# Patient Record
Sex: MALE | Race: WHITE | Employment: OTHER | URBAN - METROPOLITAN AREA
[De-identification: names, ages, dates, MRNs, and addresses within clinical notes are randomized per-mention and may not be internally consistent; named-entity substitution may affect disease eponyms.]

---

## 2017-04-04 ENCOUNTER — OFFICE VISIT (OUTPATIENT)
Dept: CARDIOLOGY CLINIC | Age: 67
End: 2017-04-04

## 2017-04-04 VITALS
BODY MASS INDEX: 30.63 KG/M2 | HEIGHT: 66 IN | SYSTOLIC BLOOD PRESSURE: 132 MMHG | WEIGHT: 190.6 LBS | OXYGEN SATURATION: 98 % | HEART RATE: 59 BPM | DIASTOLIC BLOOD PRESSURE: 84 MMHG

## 2017-04-04 DIAGNOSIS — I65.23 OBSTRUCTION OF CAROTID ARTERY, BILATERAL: ICD-10-CM

## 2017-04-04 DIAGNOSIS — I25.10 ATHEROSCLEROSIS OF NATIVE CORONARY ARTERY OF NATIVE HEART WITHOUT ANGINA PECTORIS: Primary | Chronic | ICD-10-CM

## 2017-04-04 DIAGNOSIS — N52.9 ERECTILE DYSFUNCTION, UNSPECIFIED ERECTILE DYSFUNCTION TYPE: ICD-10-CM

## 2017-04-04 DIAGNOSIS — E78.5 HYPERLIPIDEMIA, UNSPECIFIED HYPERLIPIDEMIA TYPE: Chronic | ICD-10-CM

## 2017-04-04 DIAGNOSIS — I10 ESSENTIAL HYPERTENSION, BENIGN: Chronic | ICD-10-CM

## 2017-04-04 PROCEDURE — 1123F ACP DISCUSS/DSCN MKR DOCD: CPT | Performed by: INTERNAL MEDICINE

## 2017-04-04 PROCEDURE — 1036F TOBACCO NON-USER: CPT | Performed by: INTERNAL MEDICINE

## 2017-04-04 PROCEDURE — 99214 OFFICE O/P EST MOD 30 MIN: CPT | Performed by: INTERNAL MEDICINE

## 2017-04-04 PROCEDURE — G8417 CALC BMI ABV UP PARAM F/U: HCPCS | Performed by: INTERNAL MEDICINE

## 2017-04-04 PROCEDURE — G8598 ASA/ANTIPLAT THER USED: HCPCS | Performed by: INTERNAL MEDICINE

## 2017-04-04 PROCEDURE — 4040F PNEUMOC VAC/ADMIN/RCVD: CPT | Performed by: INTERNAL MEDICINE

## 2017-04-04 PROCEDURE — 3017F COLORECTAL CA SCREEN DOC REV: CPT | Performed by: INTERNAL MEDICINE

## 2017-04-04 PROCEDURE — G8427 DOCREV CUR MEDS BY ELIG CLIN: HCPCS | Performed by: INTERNAL MEDICINE

## 2017-04-04 RX ORDER — TADALAFIL 5 MG/1
5 TABLET ORAL PRN
Qty: 30 TABLET | Status: CANCELLED | OUTPATIENT
Start: 2017-04-04

## 2017-04-04 RX ORDER — SILDENAFIL 50 MG/1
50 TABLET, FILM COATED ORAL PRN
Qty: 6 TABLET | Refills: 2 | Status: SHIPPED | OUTPATIENT
Start: 2017-04-04 | End: 2018-04-03 | Stop reason: ALTCHOICE

## 2017-04-04 RX ORDER — ASPIRIN 81 MG/1
81 TABLET ORAL DAILY
COMMUNITY

## 2017-10-24 ENCOUNTER — HOSPITAL ENCOUNTER (OUTPATIENT)
Dept: VASCULAR LAB | Age: 67
Discharge: HOME | End: 2017-10-24
Attending: INTERNAL MEDICINE | Admitting: INTERNAL MEDICINE

## 2017-10-24 DIAGNOSIS — I65.23 OCCLUSION AND STENOSIS OF BILATERAL CAROTID ARTERIES: ICD-10-CM

## 2017-10-24 DIAGNOSIS — I65.23 OBSTRUCTION OF CAROTID ARTERY, BILATERAL: ICD-10-CM

## 2017-10-25 ENCOUNTER — TELEPHONE (OUTPATIENT)
Dept: CARDIOLOGY CLINIC | Age: 67
End: 2017-10-25

## 2017-10-25 NOTE — TELEPHONE ENCOUNTER
----- Message from Jessica Dos Santos RN sent at 10/24/2017  2:24 PM EDT -----  Please call and tell him his carotids are unchanged  DGB/MM

## 2018-03-21 ENCOUNTER — HOSPITAL ENCOUNTER (OUTPATIENT)
Dept: MRI IMAGING | Age: 68
Discharge: HOME | End: 2018-03-21
Attending: ORTHOPAEDIC SURGERY | Admitting: ORTHOPAEDIC SURGERY

## 2018-03-21 DIAGNOSIS — M25.561 PAIN IN JOINT OF RIGHT KNEE: ICD-10-CM

## 2018-03-21 DIAGNOSIS — M25.561 PAIN IN RIGHT KNEE: ICD-10-CM

## 2018-04-03 ENCOUNTER — OFFICE VISIT (OUTPATIENT)
Dept: CARDIOLOGY CLINIC | Age: 68
End: 2018-04-03

## 2018-04-03 VITALS
HEIGHT: 66 IN | SYSTOLIC BLOOD PRESSURE: 136 MMHG | DIASTOLIC BLOOD PRESSURE: 86 MMHG | WEIGHT: 186 LBS | BODY MASS INDEX: 29.89 KG/M2 | HEART RATE: 64 BPM

## 2018-04-03 DIAGNOSIS — I10 ESSENTIAL HYPERTENSION, BENIGN: ICD-10-CM

## 2018-04-03 DIAGNOSIS — N52.9 ERECTILE DYSFUNCTION, UNSPECIFIED ERECTILE DYSFUNCTION TYPE: ICD-10-CM

## 2018-04-03 DIAGNOSIS — I65.23 OBSTRUCTION OF CAROTID ARTERY, BILATERAL: ICD-10-CM

## 2018-04-03 DIAGNOSIS — E78.5 HYPERLIPIDEMIA, UNSPECIFIED HYPERLIPIDEMIA TYPE: ICD-10-CM

## 2018-04-03 DIAGNOSIS — I25.10 ATHEROSCLEROSIS OF NATIVE CORONARY ARTERY OF NATIVE HEART WITHOUT ANGINA PECTORIS: Primary | ICD-10-CM

## 2018-04-03 PROCEDURE — 1036F TOBACCO NON-USER: CPT | Performed by: INTERNAL MEDICINE

## 2018-04-03 PROCEDURE — 99213 OFFICE O/P EST LOW 20 MIN: CPT | Performed by: INTERNAL MEDICINE

## 2018-04-03 PROCEDURE — 4040F PNEUMOC VAC/ADMIN/RCVD: CPT | Performed by: INTERNAL MEDICINE

## 2018-04-03 PROCEDURE — 1123F ACP DISCUSS/DSCN MKR DOCD: CPT | Performed by: INTERNAL MEDICINE

## 2018-04-03 PROCEDURE — G8417 CALC BMI ABV UP PARAM F/U: HCPCS | Performed by: INTERNAL MEDICINE

## 2018-04-03 PROCEDURE — G8427 DOCREV CUR MEDS BY ELIG CLIN: HCPCS | Performed by: INTERNAL MEDICINE

## 2018-04-03 PROCEDURE — G8598 ASA/ANTIPLAT THER USED: HCPCS | Performed by: INTERNAL MEDICINE

## 2018-04-03 PROCEDURE — 93000 ELECTROCARDIOGRAM COMPLETE: CPT | Performed by: INTERNAL MEDICINE

## 2018-04-03 PROCEDURE — 3017F COLORECTAL CA SCREEN DOC REV: CPT | Performed by: INTERNAL MEDICINE

## 2018-04-03 RX ORDER — LOSARTAN POTASSIUM AND HYDROCHLOROTHIAZIDE 25; 100 MG/1; MG/1
1 TABLET ORAL DAILY
Qty: 90 TABLET | Refills: 3 | Status: SHIPPED | OUTPATIENT
Start: 2018-04-03 | End: 2019-07-19 | Stop reason: SDUPTHER

## 2018-04-03 RX ORDER — TADALAFIL 5 MG/1
5 TABLET ORAL DAILY PRN
Qty: 30 TABLET | Refills: 3 | Status: SHIPPED | OUTPATIENT
Start: 2018-04-03 | End: 2019-07-19 | Stop reason: SDUPTHER

## 2018-04-03 RX ORDER — CLOPIDOGREL BISULFATE 75 MG/1
75 TABLET ORAL DAILY
Qty: 90 TABLET | Refills: 3 | Status: SHIPPED | OUTPATIENT
Start: 2018-04-03 | End: 2019-04-16 | Stop reason: SDUPTHER

## 2018-04-03 RX ORDER — NITROGLYCERIN 0.4 MG/1
0.4 TABLET SUBLINGUAL EVERY 5 MIN PRN
Qty: 25 TABLET | Refills: 2 | Status: SHIPPED | OUTPATIENT
Start: 2018-04-03 | End: 2019-10-31 | Stop reason: ALTCHOICE

## 2018-08-09 ENCOUNTER — TELEPHONE (OUTPATIENT)
Dept: CARDIOLOGY CLINIC | Age: 68
End: 2018-08-09

## 2018-08-09 DIAGNOSIS — I25.10 ATHEROSCLEROSIS OF NATIVE CORONARY ARTERY OF NATIVE HEART WITHOUT ANGINA PECTORIS: Primary | Chronic | ICD-10-CM

## 2018-08-09 DIAGNOSIS — E78.5 HYPERLIPIDEMIA, UNSPECIFIED HYPERLIPIDEMIA TYPE: Chronic | ICD-10-CM

## 2018-08-09 DIAGNOSIS — R06.6 HICCUPS: ICD-10-CM

## 2018-08-09 DIAGNOSIS — I25.10 ATHEROSCLEROSIS OF NATIVE CORONARY ARTERY OF NATIVE HEART WITHOUT ANGINA PECTORIS: Chronic | ICD-10-CM

## 2018-08-09 DIAGNOSIS — R07.89 CHEST DISCOMFORT: ICD-10-CM

## 2018-08-09 DIAGNOSIS — I10 ESSENTIAL HYPERTENSION, BENIGN: Primary | Chronic | ICD-10-CM

## 2018-08-09 NOTE — TELEPHONE ENCOUNTER
Wife calling to adv that pt has been having hiccups and wants to make sure its not heart related.  Please call to adv thank you

## 2018-08-13 ENCOUNTER — HOSPITAL ENCOUNTER (OUTPATIENT)
Dept: VASCULAR LAB | Age: 68
Discharge: HOME | End: 2018-08-13
Attending: PHYSICIAN ASSISTANT | Admitting: PHYSICIAN ASSISTANT

## 2018-08-13 DIAGNOSIS — M79.604 BILATERAL LEG PAIN: Primary | ICD-10-CM

## 2018-08-13 DIAGNOSIS — M79.605 BILATERAL LEG PAIN: Primary | ICD-10-CM

## 2018-08-13 DIAGNOSIS — M79.605 PAIN OF LEFT LEG: ICD-10-CM

## 2018-08-13 DIAGNOSIS — R06.6 HICCUPS: ICD-10-CM

## 2018-08-31 ENCOUNTER — HOSPITAL ENCOUNTER (OUTPATIENT)
Dept: NON INVASIVE DIAGNOSTICS | Age: 68
Discharge: HOME | End: 2018-08-31
Attending: INTERNAL MEDICINE | Admitting: INTERNAL MEDICINE

## 2018-08-31 DIAGNOSIS — I25.10 ATHEROSCLEROTIC HEART DISEASE OF NATIVE CORONARY ARTERY WITHOUT ANGINA PECTORIS: ICD-10-CM

## 2018-08-31 LAB
LEFT VENTRICULAR EJECTION FRACTION HIGH VALUE: 30 %
LEFT VENTRICULAR EJECTION FRACTION MODE: NORMAL
LV EF: 25 %
LV EF: 55 %
LV EF: 60 %
LVEF MODALITY: NORMAL
LVEF MODALITY: NORMAL

## 2019-02-18 ENCOUNTER — TELEPHONE (OUTPATIENT)
Dept: CARDIOLOGY CLINIC | Age: 69
End: 2019-02-18

## 2019-03-18 ENCOUNTER — OFFICE VISIT (OUTPATIENT)
Dept: CARDIOLOGY CLINIC | Age: 69
End: 2019-03-18
Payer: MEDICARE

## 2019-03-18 VITALS
DIASTOLIC BLOOD PRESSURE: 80 MMHG | HEIGHT: 66 IN | SYSTOLIC BLOOD PRESSURE: 146 MMHG | BODY MASS INDEX: 31.02 KG/M2 | HEART RATE: 56 BPM | WEIGHT: 193 LBS

## 2019-03-18 DIAGNOSIS — I25.10 ATHEROSCLEROSIS OF NATIVE CORONARY ARTERY OF NATIVE HEART WITHOUT ANGINA PECTORIS: Primary | Chronic | ICD-10-CM

## 2019-03-18 DIAGNOSIS — Z72.0 TOBACCO ABUSE: ICD-10-CM

## 2019-03-18 DIAGNOSIS — I65.23 BILATERAL CAROTID ARTERY STENOSIS: ICD-10-CM

## 2019-03-18 DIAGNOSIS — I10 ESSENTIAL HYPERTENSION, BENIGN: Chronic | ICD-10-CM

## 2019-03-18 DIAGNOSIS — E78.5 HYPERLIPIDEMIA, UNSPECIFIED HYPERLIPIDEMIA TYPE: Chronic | ICD-10-CM

## 2019-03-18 PROCEDURE — 4004F PT TOBACCO SCREEN RCVD TLK: CPT | Performed by: INTERNAL MEDICINE

## 2019-03-18 PROCEDURE — 1101F PT FALLS ASSESS-DOCD LE1/YR: CPT | Performed by: INTERNAL MEDICINE

## 2019-03-18 PROCEDURE — 3017F COLORECTAL CA SCREEN DOC REV: CPT | Performed by: INTERNAL MEDICINE

## 2019-03-18 PROCEDURE — 99214 OFFICE O/P EST MOD 30 MIN: CPT | Performed by: INTERNAL MEDICINE

## 2019-03-18 PROCEDURE — G8598 ASA/ANTIPLAT THER USED: HCPCS | Performed by: INTERNAL MEDICINE

## 2019-03-18 PROCEDURE — 1123F ACP DISCUSS/DSCN MKR DOCD: CPT | Performed by: INTERNAL MEDICINE

## 2019-03-18 PROCEDURE — G8484 FLU IMMUNIZE NO ADMIN: HCPCS | Performed by: INTERNAL MEDICINE

## 2019-03-18 PROCEDURE — G8427 DOCREV CUR MEDS BY ELIG CLIN: HCPCS | Performed by: INTERNAL MEDICINE

## 2019-03-18 PROCEDURE — 4040F PNEUMOC VAC/ADMIN/RCVD: CPT | Performed by: INTERNAL MEDICINE

## 2019-03-18 PROCEDURE — G8417 CALC BMI ABV UP PARAM F/U: HCPCS | Performed by: INTERNAL MEDICINE

## 2019-03-18 RX ORDER — ROSUVASTATIN CALCIUM 10 MG/1
10 TABLET, COATED ORAL DAILY
Qty: 30 TABLET | Refills: 11 | Status: SHIPPED | OUTPATIENT
Start: 2019-03-18 | End: 2020-03-17 | Stop reason: SDUPTHER

## 2019-07-19 ENCOUNTER — TELEPHONE (OUTPATIENT)
Dept: CARDIOLOGY CLINIC | Age: 69
End: 2019-07-19

## 2019-07-19 DIAGNOSIS — E78.5 HYPERLIPIDEMIA, UNSPECIFIED HYPERLIPIDEMIA TYPE: ICD-10-CM

## 2019-07-19 DIAGNOSIS — I25.10 ATHEROSCLEROSIS OF NATIVE CORONARY ARTERY OF NATIVE HEART WITHOUT ANGINA PECTORIS: ICD-10-CM

## 2019-07-19 DIAGNOSIS — I10 ESSENTIAL HYPERTENSION, BENIGN: ICD-10-CM

## 2019-07-19 DIAGNOSIS — N52.9 ERECTILE DYSFUNCTION, UNSPECIFIED ERECTILE DYSFUNCTION TYPE: ICD-10-CM

## 2019-07-19 DIAGNOSIS — I65.23 OBSTRUCTION OF CAROTID ARTERY, BILATERAL: ICD-10-CM

## 2019-07-19 RX ORDER — LOSARTAN POTASSIUM AND HYDROCHLOROTHIAZIDE 25; 100 MG/1; MG/1
1 TABLET ORAL DAILY
Qty: 90 TABLET | Refills: 3 | Status: SHIPPED | OUTPATIENT
Start: 2019-07-19 | End: 2020-01-07 | Stop reason: SDUPTHER

## 2019-07-19 RX ORDER — TADALAFIL 5 MG/1
5 TABLET ORAL DAILY PRN
Qty: 30 TABLET | Refills: 0 | Status: SHIPPED | OUTPATIENT
Start: 2019-07-19 | End: 2019-09-22 | Stop reason: SDUPTHER

## 2019-07-19 RX ORDER — CLOPIDOGREL BISULFATE 75 MG/1
TABLET ORAL
Qty: 90 TABLET | Refills: 3 | Status: SHIPPED | OUTPATIENT
Start: 2019-07-19 | End: 2020-03-17 | Stop reason: SDUPTHER

## 2019-09-22 DIAGNOSIS — N52.9 ERECTILE DYSFUNCTION, UNSPECIFIED ERECTILE DYSFUNCTION TYPE: ICD-10-CM

## 2019-09-22 DIAGNOSIS — I10 ESSENTIAL HYPERTENSION, BENIGN: ICD-10-CM

## 2019-09-22 DIAGNOSIS — E78.5 HYPERLIPIDEMIA, UNSPECIFIED HYPERLIPIDEMIA TYPE: ICD-10-CM

## 2019-09-22 DIAGNOSIS — I65.23 OBSTRUCTION OF CAROTID ARTERY, BILATERAL: ICD-10-CM

## 2019-09-22 DIAGNOSIS — I25.10 ATHEROSCLEROSIS OF NATIVE CORONARY ARTERY OF NATIVE HEART WITHOUT ANGINA PECTORIS: ICD-10-CM

## 2019-09-24 RX ORDER — TADALAFIL 5 MG/1
TABLET ORAL
Qty: 30 TABLET | Refills: 0 | Status: SHIPPED | OUTPATIENT
Start: 2019-09-24 | End: 2019-10-27 | Stop reason: SDUPTHER

## 2019-10-27 DIAGNOSIS — I25.10 ATHEROSCLEROSIS OF NATIVE CORONARY ARTERY OF NATIVE HEART WITHOUT ANGINA PECTORIS: ICD-10-CM

## 2019-10-27 DIAGNOSIS — E78.5 HYPERLIPIDEMIA, UNSPECIFIED HYPERLIPIDEMIA TYPE: ICD-10-CM

## 2019-10-27 DIAGNOSIS — I10 ESSENTIAL HYPERTENSION, BENIGN: ICD-10-CM

## 2019-10-27 DIAGNOSIS — N52.9 ERECTILE DYSFUNCTION, UNSPECIFIED ERECTILE DYSFUNCTION TYPE: ICD-10-CM

## 2019-10-27 DIAGNOSIS — I65.23 OBSTRUCTION OF CAROTID ARTERY, BILATERAL: ICD-10-CM

## 2019-10-31 RX ORDER — TADALAFIL 5 MG/1
TABLET ORAL
Qty: 30 TABLET | Refills: 0 | Status: SHIPPED | OUTPATIENT
Start: 2019-10-31 | End: 2020-01-06

## 2020-01-06 RX ORDER — TADALAFIL 5 MG/1
TABLET ORAL
Qty: 30 TABLET | Refills: 0 | Status: SHIPPED | OUTPATIENT
Start: 2020-01-06 | End: 2020-04-14

## 2020-01-07 ENCOUNTER — TELEPHONE (OUTPATIENT)
Dept: CARDIOLOGY CLINIC | Age: 70
End: 2020-01-07

## 2020-01-07 RX ORDER — LOSARTAN POTASSIUM AND HYDROCHLOROTHIAZIDE 25; 100 MG/1; MG/1
1 TABLET ORAL DAILY
Qty: 90 TABLET | Refills: 3 | Status: SHIPPED | OUTPATIENT
Start: 2020-01-07 | End: 2020-01-07 | Stop reason: RX

## 2020-01-07 RX ORDER — HYDROCHLOROTHIAZIDE 25 MG/1
25 TABLET ORAL EVERY MORNING
Qty: 90 TABLET | Refills: 3 | Status: SHIPPED | OUTPATIENT
Start: 2020-01-07 | End: 2020-03-17 | Stop reason: SDUPTHER

## 2020-01-07 RX ORDER — LOSARTAN POTASSIUM 25 MG/1
25 TABLET ORAL DAILY
Qty: 90 TABLET | Refills: 3 | Status: SHIPPED | OUTPATIENT
Start: 2020-01-07 | End: 2020-01-13

## 2020-01-07 NOTE — TELEPHONE ENCOUNTER
Just received a rx for lorsartan/HCTZ 100/125 but the combination  med is on backorder . Pharmacy would like DGB to call in two separate rx's .

## 2020-01-13 ENCOUNTER — TELEPHONE (OUTPATIENT)
Dept: CARDIOLOGY CLINIC | Age: 70
End: 2020-01-13

## 2020-01-13 RX ORDER — LOSARTAN POTASSIUM 100 MG/1
100 TABLET ORAL DAILY
Qty: 90 TABLET | Refills: 3 | Status: SHIPPED | OUTPATIENT
Start: 2020-01-13 | End: 2020-03-17 | Stop reason: SDUPTHER

## 2020-01-13 NOTE — TELEPHONE ENCOUNTER
Viviane Nielsen M back she has another question about the Rx losartan (COZAAR) 25 MG tablet and hydrochlorothiazide (HYDRODIURIL) 25 MG tablet. Marla David said it was ok to split however there are 2 different strengths and she needs to know which is correct?  Pls call to advise Thank you

## 2020-03-17 ENCOUNTER — TELEPHONE (OUTPATIENT)
Dept: CARDIOLOGY CLINIC | Age: 70
End: 2020-03-17

## 2020-03-17 RX ORDER — LOSARTAN POTASSIUM 100 MG/1
100 TABLET ORAL DAILY
Qty: 90 TABLET | Refills: 3 | Status: SHIPPED | OUTPATIENT
Start: 2020-03-17 | End: 2020-08-31 | Stop reason: SDUPTHER

## 2020-03-17 RX ORDER — CLOPIDOGREL BISULFATE 75 MG/1
TABLET ORAL
Qty: 90 TABLET | Refills: 3 | Status: SHIPPED | OUTPATIENT
Start: 2020-03-17 | End: 2020-12-16 | Stop reason: SDUPTHER

## 2020-03-17 RX ORDER — ROSUVASTATIN CALCIUM 10 MG/1
10 TABLET, COATED ORAL DAILY
Qty: 90 TABLET | Refills: 3 | Status: SHIPPED | OUTPATIENT
Start: 2020-03-17 | End: 2020-12-16 | Stop reason: SDUPTHER

## 2020-03-17 RX ORDER — HYDROCHLOROTHIAZIDE 25 MG/1
25 TABLET ORAL EVERY MORNING
Qty: 90 TABLET | Refills: 3 | Status: SHIPPED | OUTPATIENT
Start: 2020-03-17 | End: 2020-09-15 | Stop reason: ALTCHOICE

## 2020-04-14 RX ORDER — TADALAFIL 5 MG/1
TABLET ORAL
Qty: 30 TABLET | Refills: 0 | Status: SHIPPED | OUTPATIENT
Start: 2020-04-14 | End: 2020-06-22

## 2020-04-14 NOTE — TELEPHONE ENCOUNTER
Received refill request for Cialis 5 mg from [facility]. Last OV: 3/18/19 D[facility]    Last Labs: 2/10/20    Last Refill: 1/6/20 #30 with no refills    Next Appt: Was on recall list for 3/17/20 for yearly follow up but no appointment has been scheduled.

## 2020-06-22 RX ORDER — TADALAFIL 5 MG/1
TABLET ORAL
Qty: 30 TABLET | Refills: 0 | Status: SHIPPED | OUTPATIENT
Start: 2020-06-22 | End: 2020-07-31 | Stop reason: SDUPTHER

## 2020-07-31 ENCOUNTER — TELEPHONE (OUTPATIENT)
Dept: CARDIOLOGY CLINIC | Age: 70
End: 2020-07-31

## 2020-07-31 RX ORDER — TADALAFIL 5 MG/1
TABLET ORAL
Qty: 8 TABLET | Refills: 0 | Status: SHIPPED | OUTPATIENT
Start: 2020-07-31 | End: 2020-09-15 | Stop reason: SDUPTHER

## 2020-07-31 NOTE — TELEPHONE ENCOUNTER
Received refill request for Tadalafil 5 mg from [facility].     Last OV: 3/18/19    Last Labs: 2/10/20    Last Refill: 6/22/20 #30 with no refills    Next Appt: None

## 2020-08-06 RX ORDER — TADALAFIL 5 MG/1
TABLET ORAL
Qty: 30 TABLET | Refills: 0 | OUTPATIENT
Start: 2020-08-06

## 2020-08-10 PROBLEM — H91.93 HEARING DIFFICULTY OF BOTH EARS: Status: ACTIVE | Noted: 2020-07-30

## 2020-08-10 PROBLEM — J30.1 ALLERGIC RHINITIS DUE TO POLLEN: Status: ACTIVE | Noted: 2020-08-10

## 2020-08-10 PROBLEM — R06.83 SNORING: Status: ACTIVE | Noted: 2020-07-30

## 2020-08-10 PROBLEM — R97.20 ELEVATED PSA: Status: ACTIVE | Noted: 2020-08-03

## 2020-08-10 PROBLEM — H61.23 IMPACTED CERUMEN, BILATERAL: Status: ACTIVE | Noted: 2017-12-27

## 2020-08-10 PROBLEM — G47.8 NON-RESTORATIVE SLEEP: Status: ACTIVE | Noted: 2017-06-28

## 2020-08-10 PROBLEM — N18.30 HYPERTENSIVE KIDNEY DISEASE WITH CHRONIC KIDNEY DISEASE STAGE III (HCC): Status: ACTIVE | Noted: 2019-08-27

## 2020-08-10 PROBLEM — I12.9 HYPERTENSIVE KIDNEY DISEASE WITH CHRONIC KIDNEY DISEASE STAGE III (HCC): Status: ACTIVE | Noted: 2019-08-27

## 2020-08-10 PROBLEM — E11.8 TYPE 2 DIABETES MELLITUS WITH COMPLICATION, WITHOUT LONG-TERM CURRENT USE OF INSULIN (HCC): Status: ACTIVE | Noted: 2019-04-29

## 2020-08-31 RX ORDER — TADALAFIL 5 MG/1
5 TABLET ORAL PRN
Qty: 30 TABLET | Refills: 0 | Status: SHIPPED | OUTPATIENT
Start: 2020-08-31 | End: 2020-09-15 | Stop reason: SDUPTHER

## 2020-08-31 RX ORDER — LOSARTAN POTASSIUM 100 MG/1
100 TABLET ORAL DAILY
Qty: 90 TABLET | Refills: 0 | Status: SHIPPED | OUTPATIENT
Start: 2020-08-31 | End: 2020-09-15 | Stop reason: ALTCHOICE

## 2020-08-31 NOTE — TELEPHONE ENCOUNTER
RX APPROVAL:      Refill:   Requested Prescriptions      No prescriptions requested or ordered in this encounter      Last OV: 3/18/2019 next ov 9/15/20  Last EKG:   Last Labs: ketjose 7/30/20  Lab Results   Component Value Date    GLUCOSE 113 06/24/2015    BUN 25 06/24/2015    CREATININE 1.3 06/24/2015    LABGLOM 55 06/24/2015     06/24/2015    K 3.8 06/24/2015     06/24/2015    CO2 26 06/24/2015    CALCIUM 9.7 06/24/2015     Lab Results   Component Value Date     06/24/2015     06/24/2015    CO2 26 06/24/2015    ANIONGAP 15 06/24/2015    GLUCOSE 113 06/24/2015    BUN 25 06/24/2015    CREATININE 1.3 06/24/2015    LABGLOM 55 06/24/2015    GFRAA >60 06/24/2015    GFRAA >60 09/21/2012    CALCIUM 9.7 06/24/2015    PROT 6.8 03/24/2014    PROT 7.0 09/21/2012    LABALBU 4.1 03/24/2014    BILITOT 0.6 03/24/2014    ALKPHOS 55 03/24/2014    AST 17 06/24/2015    ALT 18 06/24/2015     Lab Results   Component Value Date    ALT 18 06/24/2015    AST 17 06/24/2015     Lab Results   Component Value Date    K 3.8 06/24/2015       Plan and labs reviewed

## 2020-08-31 NOTE — TELEPHONE ENCOUNTER
Medication Refill    Medication needing refilled:tadalafil (CIALIS) 5 MG tablet        Doseage of the medication:    How are you taking this medication (QD, BID, TID, QID, PRN):TAKE ONE TABLET BY MOUTH DAILY AS NEEDED  FOR ERECTILE DYSFUNCTION     30 or 90 day supply called in: 8 tablets    Which Pharmacy are we sending the medication to?: [facility] [phone]      Medication Refill    Medication needing refilled:losartan (COZAAR) 100 MG tablet         Doseage of the medication:100mg    How are you taking this medication (QD, BID, TID, QID, PRN):Take 1 tablet by mouth daily     30 or 90 day supply called in:90    Which Pharmacy are we sending the medication to?: [facility] [phone]      Pt also has some questions about his potassium being low and is it okay to take potassium pills?  pls call to advise thank you

## 2020-09-14 NOTE — PROGRESS NOTES
[facility]   Cardiac Followup    Referring Provider:  Xochitl Walker MD     Chief Complaint   Patient presents with    Hypertension     No complaints     Hyperlipidemia    1 Year Follow Up      History of Present Illness:  Mr. Rafi Diaz  he has a history of  CAD, (stent to RCA 70% LAD),htn hyperlipidemia He is intolerant to lipitor due to leg cramps in the past.    Today, Venecia Thakkar states he is doing well. He has no chest  Pain,  Sob palpitations or dizziness. Checks his  blood pressure at home and it is better than what he got today. He has no change in exercise tolerance. His LDL has been controlled afer restarting statin therapy. LDL is 62. His  K is low and creatinine up due to medications. Past Medical History:   has a past medical history of Arthritis, Chronic kidney disease, Hyperlipidemia, and Hypertension. Surgical History:   has a past surgical history that includes Coronary angioplasty with stent and hernia repair. Social History:   reports that he has been smoking pipe. He has smoked for the past 40.00 years. He has never used smokeless tobacco. He reports current alcohol use of about 2.0 standard drinks of alcohol per week. He reports current drug use. Drug: Marijuana. Family History:  family history includes Diabetes in his mother; Heart Attack in his father.      Home Medications:  Current Outpatient Medications   Medication Sig Dispense Refill    losartan-hydroCHLOROthiazide (HYZAAR) 100-25 MG per tablet Take 0.5 tablets by mouth daily 30 tablet 11    tadalafil (CIALIS) 5 MG tablet TAKE ONE TABLET BY MOUTH DAILY AS NEEDED  FOR ERECTILE DYSFUNCTION 8 tablet 0    clopidogrel (PLAVIX) 75 MG tablet TAKE 1 TABLET BY MOUTH ONCE DAILY 90 tablet 3    rosuvastatin (CRESTOR) 10 MG tablet Take 1 tablet by mouth daily 90 tablet 3    metoprolol tartrate (LOPRESSOR) 25 MG tablet Take 1 tablet by mouth 2 times daily 180 tablet 3    Coenzyme Q10 (COQ10) 400 MG CAPS Take 1 capsule by mouth      aspirin EC 81 MG EC tablet Take 81 mg by mouth daily      fenofibrate (TRICOR) 145 MG tablet Take 145 mg by mouth daily       No current facility-administered medications for this visit. Allergies:  Statins     Review of Systems:   · Constitutional: there has been no unanticipated weight loss. There's been no change in energy level, sleep pattern, or activity level. · Eyes: No visual changes or diplopia. No scleral icterus. · ENT: No Headaches, hearing loss or vertigo. No mouth sores or sore throat. · Cardiovascular: Reviewed in HPI  · Respiratory: No cough or wheezing, no sputum production. No hematemesis. · Gastrointestinal: No abdominal pain, appetite loss, blood in stools. No change in bowel or bladder habits. · Genitourinary: No dysuria, trouble voiding, or hematuria. · Musculoskeletal:  No gait disturbance, weakness + knee joint complaints. · Integumentary: No rash or pruritis. · Neurological: No headache, diplopia, change in muscle strength, numbness or tingling. No change in gait, balance, coordination, mood, affect, memory, mentation, behavior. · Psychiatric: No anxiety, no depression. · Endocrine: No malaise, fatigue or temperature intolerance. No excessive thirst, fluid intake, or urination. No tremor. · Hematologic/Lymphatic: No abnormal bruising or bleeding, blood clots or swollen lymph nodes. · Allergic/Immunologic: No nasal congestion or hives. Physical Examination:    Vitals:    09/15/20 1001   BP: 128/86   Pulse: 58   Resp: 18   SpO2: 98%   Constitutional and General Appearance:   . NAD   SKIN:  .     Warm and dry  EYES:    .     EOMI  Neck:   . Normal carotid contour  Respiratory:  Normal excursion and expansion without use of accessory muscles  Resp Auscultation: Normal breath sounds without dullness  Cardiovascular:   The apical impulses not displaced  Heart tones are crisp and normal  Cervical veins are not engorged  Normal S1S2, No S3, No Murmur  Peripheral pulses are symmetrical and full  Extremities: There is no clubbing, cyanosis of the extremities. No edema  Femoral Arteries: 2+ and equal  Pedal Pulses: 2+ and equal   Abdomen:  No masses or tenderness  Liver/Spleen: No Abnormalities Noted  Neurological/Psychiatric:  Alert and oriented in all spheres  Moves all extremities well  No abnormalities of mood, affect, memory    8/31/18 echo--sclerotic aortic valve trivial aortic regurgitation    2012 CT of abd--no mention of AAA    Assessment:     Coronary atherosclerosis of native coronary artery  2007  POLLY to RCA  2018  myoview--small to med sized inferolateral fixed defect of severe intensity c/w infarction the territory of the prox to distal L cx  Consistent with  Known disease, no symptoms      Other and unspecified hyperlipidemia  crestor 10  Mg daily and tricor  7/30/2020  Tc 130 tri 127  hdl 43 ldl 62 ast 17      Essential hypertension, benign  Blood pressure 128/86, pulse 58, resp. rate 18, height 5' 6\" (1.676 m), weight 187 lb 6.4 oz (85 kg), SpO2 98 %. On cozaar 100 mg daily hydrodiuril 25 mg daily lopressor 25mg daily  2019 renal artery ultrasound--no YAO    Carotid disease  3/19   Right 50-69% left 16-49%  Following up with surgeon as recommended by pcp    Tobacco abuse  discussed risk of tobacco use  Recommended decrease tobacco use    Plan:  Tobacco cessation discussed at length  Carotid dopplers  Reduce Losartan Hctz in 1/2--malini BMP per renal/PCP    Thank you for allowing me to participate in the care of this individual.      Liza Roche M.D., Fausto Floyd:  I, Grisel Constantino, am scribing for and in the presence of Sabina Gauthier MD.   Lequita Bone 09/15/20 10:17 AM   Provider Brigida Ashraf is working as a scribe for and in the presence of me Sabina Gauthier MD).   Working as a scribe, Grisel Constantino may have prepopulated components of this note with my historical  intellectual property under my direct supervision. Any additions to this intellectual property were performed in my presence and at my direction. Furthermore, the content and accuracy of this note have been reviewed by me Mikayla Llamas MD).

## 2020-09-15 ENCOUNTER — OFFICE VISIT (OUTPATIENT)
Dept: CARDIOLOGY CLINIC | Age: 70
End: 2020-09-15
Payer: MEDICARE

## 2020-09-15 VITALS
HEART RATE: 58 BPM | RESPIRATION RATE: 18 BRPM | WEIGHT: 187.4 LBS | BODY MASS INDEX: 30.12 KG/M2 | SYSTOLIC BLOOD PRESSURE: 128 MMHG | OXYGEN SATURATION: 98 % | HEIGHT: 66 IN | DIASTOLIC BLOOD PRESSURE: 86 MMHG

## 2020-09-15 PROCEDURE — 4004F PT TOBACCO SCREEN RCVD TLK: CPT | Performed by: INTERNAL MEDICINE

## 2020-09-15 PROCEDURE — G8427 DOCREV CUR MEDS BY ELIG CLIN: HCPCS | Performed by: INTERNAL MEDICINE

## 2020-09-15 PROCEDURE — 3017F COLORECTAL CA SCREEN DOC REV: CPT | Performed by: INTERNAL MEDICINE

## 2020-09-15 PROCEDURE — 1123F ACP DISCUSS/DSCN MKR DOCD: CPT | Performed by: INTERNAL MEDICINE

## 2020-09-15 PROCEDURE — 4040F PNEUMOC VAC/ADMIN/RCVD: CPT | Performed by: INTERNAL MEDICINE

## 2020-09-15 PROCEDURE — 99213 OFFICE O/P EST LOW 20 MIN: CPT | Performed by: INTERNAL MEDICINE

## 2020-09-15 PROCEDURE — G8417 CALC BMI ABV UP PARAM F/U: HCPCS | Performed by: INTERNAL MEDICINE

## 2020-09-15 RX ORDER — TADALAFIL 5 MG/1
TABLET ORAL
Qty: 8 TABLET | Refills: 0 | Status: SHIPPED | OUTPATIENT
Start: 2020-09-15 | End: 2020-12-16 | Stop reason: SDUPTHER

## 2020-09-15 RX ORDER — LOSARTAN POTASSIUM AND HYDROCHLOROTHIAZIDE 25; 100 MG/1; MG/1
0.5 TABLET ORAL DAILY
Qty: 30 TABLET | Refills: 11 | Status: SHIPPED | OUTPATIENT
Start: 2020-09-15 | End: 2020-12-16 | Stop reason: SDUPTHER

## 2020-09-24 ENCOUNTER — TELEPHONE (OUTPATIENT)
Dept: CARDIOLOGY CLINIC | Age: 70
End: 2020-09-24

## 2020-09-24 NOTE — TELEPHONE ENCOUNTER
Last OV 9/15/20    Spoke to the pt's wife-stated he is not taking the second dose of Metoprolol 25 mg. He didn't tell us that when last seen. Was given five potassium tabs from the PCP, asking if it's ok to take them. Labs in [facility] from 8/12 resulted the Potassium at 3.2. Having a prostate biopsy-is it ok to hold the Plavix, and ASA?

## 2020-09-24 NOTE — TELEPHONE ENCOUNTER
Wife asking about med changes made at last appt . Having a prostate biopsy 10/2 and needs to know about holding plavix . Wife states patient is hard of hearing and has so many doctors they are getting confused on meds .

## 2020-09-25 ENCOUNTER — TELEPHONE (OUTPATIENT)
Dept: CARDIOLOGY CLINIC | Age: 70
End: 2020-09-25

## 2020-09-25 NOTE — TELEPHONE ENCOUNTER
Outgoing call pt wife states her  is out of town and wont be back until late tonight and he dose not have his plavix and aspirin with him out of town wife  Wants to know would he be okay to start taking the medication tomorrow when he come back.   And would he be okay to still stop the medication on Sunday for his up coming biopsy surgery wife wants to what should he do     Please advise

## 2020-09-25 NOTE — TELEPHONE ENCOUNTER
Spoke to the pt's wife-gave instructions per Dr. Cecille Wagner. Stated the letter from the surgeon's office asked for 7 days off Plavix, and Aspirin. The pt has gone out of town without these two medications. Pt's wife wants Dr. Deepika muñoz.

## 2020-09-25 NOTE — TELEPHONE ENCOUNTER
He may proceed with surgery and come off of asa and plavix 5 days prior. Restart asap after surgery  He is in agreement with physician monitoring and treating potassium.  Can stay on same dose of metoprolol, and continue to monitor blood pressure at home  dgb/mm

## 2020-10-14 ENCOUNTER — HOSPITAL ENCOUNTER (OUTPATIENT)
Dept: VASCULAR LAB | Age: 70
Discharge: HOME | End: 2020-10-14
Payer: MEDICARE

## 2020-10-14 ENCOUNTER — HOSPITAL ENCOUNTER (OUTPATIENT)
Age: 70
Discharge: HOME | End: 2020-10-14
Payer: MEDICARE

## 2020-10-14 LAB
ALBUMIN SERPL-MCNC: 3.9 G/DL (ref 3.4–5)
ANION GAP SERPL CALCULATED.3IONS-SCNC: 10 MMOL/L (ref 3–16)
BUN BLDV-MCNC: 18 MG/DL (ref 7–20)
CALCIUM SERPL-MCNC: 9.6 MG/DL (ref 8.3–10.6)
CHLORIDE BLD-SCNC: 100 MMOL/L (ref 99–110)
CO2: 30 MMOL/L (ref 21–32)
CREAT SERPL-MCNC: 1.2 MG/DL (ref 0.8–1.3)
CREATININE URINE: 66.5 MG/DL (ref 39–259)
GFR AFRICAN AMERICAN: >60
GFR NON-AFRICAN AMERICAN: 60
GLUCOSE BLD-MCNC: 121 MG/DL (ref 70–99)
HCT VFR BLD CALC: 43.4 % (ref 40.5–52.5)
HEMOGLOBIN: 14.5 G/DL (ref 13.5–17.5)
MCH RBC QN AUTO: 29.5 PG (ref 26–34)
MCHC RBC AUTO-ENTMCNC: 33.4 G/DL (ref 31–36)
MCV RBC AUTO: 88.4 FL (ref 80–100)
PDW BLD-RTO: 15.1 % (ref 12.4–15.4)
PHOSPHORUS: 2.5 MG/DL (ref 2.5–4.9)
PLATELET # BLD: 223 K/UL (ref 135–450)
PMV BLD AUTO: 8.2 FL (ref 5–10.5)
POTASSIUM SERPL-SCNC: 3.8 MMOL/L (ref 3.5–5.1)
PROTEIN PROTEIN: 5 MG/DL
RBC # BLD: 4.91 M/UL (ref 4.2–5.9)
SODIUM BLD-SCNC: 140 MMOL/L (ref 136–145)
WBC # BLD: 8.5 K/UL (ref 4–11)

## 2020-10-14 PROCEDURE — 36415 COLL VENOUS BLD VENIPUNCTURE: CPT

## 2020-10-14 PROCEDURE — 93880 EXTRACRANIAL BILAT STUDY: CPT

## 2020-10-14 PROCEDURE — 82570 ASSAY OF URINE CREATININE: CPT

## 2020-10-14 PROCEDURE — 80069 RENAL FUNCTION PANEL: CPT

## 2020-10-14 PROCEDURE — 85027 COMPLETE CBC AUTOMATED: CPT

## 2020-10-14 PROCEDURE — 84156 ASSAY OF PROTEIN URINE: CPT

## 2020-10-15 ENCOUNTER — TELEPHONE (OUTPATIENT)
Dept: CARDIOLOGY CLINIC | Age: 70
End: 2020-10-15

## 2020-10-15 NOTE — TELEPHONE ENCOUNTER
----- Message from Jeremiah Smyth MD sent at 10/15/2020 11:23 AM EDT -----  Let him know carotid US looks fine--less than 50% block on both sides--Will malini in 1 year

## 2020-10-15 NOTE — TELEPHONE ENCOUNTER
Left voicemail detailing  normal ultrasound results per [facility]. Left call back number in case of questions. Pt aware.

## 2020-10-19 ENCOUNTER — HOSPITAL ENCOUNTER (OUTPATIENT)
Dept: NUCLEAR MEDICINE | Age: 70
Discharge: HOME | End: 2020-10-19
Payer: MEDICARE

## 2020-10-19 PROCEDURE — 78306 BONE IMAGING WHOLE BODY: CPT

## 2020-10-19 PROCEDURE — 3430000000 HC RX DIAGNOSTIC RADIOPHARMACEUTICAL: Performed by: UROLOGY

## 2020-10-19 PROCEDURE — A9503 TC99M MEDRONATE: HCPCS | Performed by: UROLOGY

## 2020-10-19 RX ORDER — TC 99M MEDRONATE 20 MG/10ML
25.19 INJECTION, POWDER, LYOPHILIZED, FOR SOLUTION INTRAVENOUS
Status: COMPLETED | OUTPATIENT
Start: 2020-10-19 | End: 2020-10-19

## 2020-10-19 RX ADMIN — TC 99M MEDRONATE 25.19 MILLICURIE: 20 INJECTION, POWDER, LYOPHILIZED, FOR SOLUTION INTRAVENOUS at 11:19

## 2020-10-20 ENCOUNTER — TELEPHONE (OUTPATIENT)
Dept: CARDIOLOGY CLINIC | Age: 70
End: 2020-10-20

## 2020-10-20 NOTE — TELEPHONE ENCOUNTER
----- Message from Roslyn Leonardo MD sent at 10/20/2020 12:38 PM EDT -----  Let him know the carotid looks fine and is unchanged from prior studies. Less than 50% bilaterally.

## 2020-10-21 ENCOUNTER — TELEPHONE (OUTPATIENT)
Dept: CARDIOLOGY CLINIC | Age: 70
End: 2020-10-21

## 2020-10-21 NOTE — TELEPHONE ENCOUNTER
Wife states pt is going to be having a pet scan with IV dye and is asking if that is ok for him to do.  Please call to advise

## 2020-10-21 NOTE — TELEPHONE ENCOUNTER
I am not certain what type of dye they are using. Regardless, if he is receiving iv DYE, IT LIKELY NEEDS TO BE CLEARED THROUGH HIS KIDNEYS WHICH ARE NOT NORMAL PLACING HIM AT SOME INCREASED RISK. Would find out which dye they are using aND THEN GET IT CLEARED WITH HIS KIDNEY DOCTOR. With his degree of kidney dysfunction,  I suspect it will be safe though would ask Dr Richa Long if there is anything else he should do before the test to minimize his risk.

## 2020-10-23 ENCOUNTER — HOSPITAL ENCOUNTER (OUTPATIENT)
Dept: CT IMAGING | Age: 70
Discharge: HOME | End: 2020-10-23
Payer: MEDICARE

## 2020-10-23 PROCEDURE — 6360000004 HC RX CONTRAST MEDICATION: Performed by: UROLOGY

## 2020-10-23 PROCEDURE — 74177 CT ABD & PELVIS W/CONTRAST: CPT

## 2020-10-23 RX ADMIN — IOPAMIDOL 75 ML: 755 INJECTION, SOLUTION INTRAVENOUS at 15:37

## 2020-10-23 RX ADMIN — IOHEXOL 50 ML: 240 INJECTION, SOLUTION INTRATHECAL; INTRAVASCULAR; INTRAVENOUS; ORAL at 15:37

## 2020-11-11 ENCOUNTER — TELEPHONE (OUTPATIENT)
Dept: CARDIOLOGY CLINIC | Age: 70
End: 2020-11-11

## 2020-11-11 NOTE — TELEPHONE ENCOUNTER
Pt wife calling pt has just been diagnosed with prostate cancer and and on Tues he is scheduled for Hormone therapy. Dr Deborah Kumar wants to know if [facility] is ok with pt getting Erigard, hormone therapy drug?  Pls call to advise Thank you

## 2020-11-17 ENCOUNTER — TELEPHONE (OUTPATIENT)
Dept: CARDIOLOGY CLINIC | Age: 70
End: 2020-11-17

## 2020-11-17 NOTE — TELEPHONE ENCOUNTER
Patient getting hormone injection  At 9:40 am today . They want note stating it is ok for him to have injection please fax [phone].  Questions call Dr Mary Santos at [phone]

## 2020-12-01 ENCOUNTER — TELEPHONE (OUTPATIENT)
Dept: CARDIOLOGY CLINIC | Age: 70
End: 2020-12-01

## 2020-12-01 NOTE — TELEPHONE ENCOUNTER
Spoke to the pt's wife/EC-gave instructions per Dr. Melody Thakkar. Faxed the myoview order to [facility], [phone].

## 2020-12-01 NOTE — TELEPHONE ENCOUNTER
Will need a lexiscan myoview, if unchanged from the previous he can hold asa and plavix 5 days prior. Please call patient to let him know.  Order placed for lexiscan please get scheduled

## 2020-12-07 ENCOUNTER — HOSPITAL ENCOUNTER (OUTPATIENT)
Dept: NON INVASIVE DIAGNOSTICS | Age: 70
Discharge: HOME | End: 2020-12-07
Payer: MEDICARE

## 2020-12-07 LAB
LV EF: 68 %
LVEF MODALITY: NORMAL

## 2020-12-07 PROCEDURE — 6360000002 HC RX W HCPCS: Performed by: INTERNAL MEDICINE

## 2020-12-07 PROCEDURE — 93017 CV STRESS TEST TRACING ONLY: CPT | Performed by: INTERNAL MEDICINE

## 2020-12-07 PROCEDURE — 78452 HT MUSCLE IMAGE SPECT MULT: CPT | Performed by: INTERNAL MEDICINE

## 2020-12-07 PROCEDURE — A9502 TC99M TETROFOSMIN: HCPCS | Performed by: INTERNAL MEDICINE

## 2020-12-07 PROCEDURE — 3430000000 HC RX DIAGNOSTIC RADIOPHARMACEUTICAL: Performed by: INTERNAL MEDICINE

## 2020-12-07 RX ADMIN — TETROFOSMIN 10 MILLICURIE: 1.38 INJECTION, POWDER, LYOPHILIZED, FOR SOLUTION INTRAVENOUS at 13:14

## 2020-12-07 RX ADMIN — TETROFOSMIN 30 MILLICURIE: 1.38 INJECTION, POWDER, LYOPHILIZED, FOR SOLUTION INTRAVENOUS at 14:02

## 2020-12-07 RX ADMIN — REGADENOSON 0.4 MG: 0.08 INJECTION, SOLUTION INTRAVENOUS at 13:59

## 2020-12-11 ENCOUNTER — TELEPHONE (OUTPATIENT)
Dept: CARDIOLOGY CLINIC | Age: 70
End: 2020-12-11

## 2020-12-11 RX ORDER — LOSARTAN POTASSIUM AND HYDROCHLOROTHIAZIDE 25; 100 MG/1; MG/1
1 TABLET ORAL DAILY
Qty: 30 TABLET | Refills: 11 | Status: CANCELLED | OUTPATIENT
Start: 2020-12-11

## 2020-12-11 RX ORDER — LOSARTAN POTASSIUM AND HYDROCHLOROTHIAZIDE 25; 100 MG/1; MG/1
TABLET ORAL
Qty: 90 TABLET | Refills: 2 | OUTPATIENT
Start: 2020-12-11

## 2020-12-11 NOTE — TELEPHONE ENCOUNTER
Wife asking for pt's stress test results. Also wife states pt needs losartan refill and states pt was put back on one tablet daily. Please call to advise.

## 2020-12-11 NOTE — TELEPHONE ENCOUNTER
See message below. Patient's spouse Brady Rojas calling for results of patient's stress test. Please review and advise. Results in Epic. Also Brady Rojas states patient needs a refill on Losartan-HCTZ, explained to her that this was just filled on 9/15/20 #30 with 11 refills and to call pharmacy as he should have refills left. Brady Rojas is requesting that Cora Crawford RN call her to discuss patient's medication. Brady Rojas states that at patient's last appointment with DOMINGO on 9/15/20, patient told D[facility] he was  is taking Metoprolol 25 mg twice a day but Brady Rojas states patient only takes it once a day and has been for a year. At 9/15/20 appointment, DGB decreased Losartan/HCTZ 100-25 mg down to half tablet daily due to patient's creatinine levels being high but patient never decreased it, he is still currently taking 1 tablet daily. Brady Rojas states patient didn't decrease to half tablet daily because he is only taking metoprolol once a day. Brady Rojas states patient would like to keep meds the way he is taking them. Brady Rojas requesting to discuss with [facility]. Brady Rojas can be reached at [phone] and she is aware that DOMINGO and Cora Crawford RN are out of the office this week and will return on Monday.

## 2020-12-11 NOTE — TELEPHONE ENCOUNTER
Lov 9/15/2020  Labs 10/14/2020  Lrf this dose was adjusted on 9/15/2020 this refill is not appropriate.

## 2020-12-16 RX ORDER — TADALAFIL 5 MG/1
TABLET ORAL
Qty: 8 TABLET | Refills: 0 | Status: SHIPPED | OUTPATIENT
Start: 2020-12-16

## 2020-12-16 RX ORDER — LOSARTAN POTASSIUM AND HYDROCHLOROTHIAZIDE 25; 100 MG/1; MG/1
0.5 TABLET ORAL DAILY
Qty: 90 TABLET | Refills: 3 | Status: SHIPPED | OUTPATIENT
Start: 2020-12-16 | End: 2020-12-22 | Stop reason: SDUPTHER

## 2020-12-16 RX ORDER — ROSUVASTATIN CALCIUM 10 MG/1
10 TABLET, COATED ORAL DAILY
Qty: 90 TABLET | Refills: 3 | Status: SHIPPED | OUTPATIENT
Start: 2020-12-16 | End: 2021-07-01

## 2020-12-16 RX ORDER — CLOPIDOGREL BISULFATE 75 MG/1
TABLET ORAL
Qty: 90 TABLET | Refills: 3 | Status: SHIPPED | OUTPATIENT
Start: 2020-12-16 | End: 2021-07-01 | Stop reason: SDUPTHER

## 2020-12-16 NOTE — TELEPHONE ENCOUNTER
Called and talked  To Aleida at length. She would like Dr James Gonsalves to know Tory Truong has never cut the losartan/hctz in 1/2 like DGB ordered. He has continued to take a whole tablet. Also only taking metoprolol tartrate once a day, instead of twice a day. He does not want to change how he is taking these medications. Reports he saw a nephrologist who said it was ok to take the meds this way  Wants Dr James Gonsalves to know, and wife would like a call back if this is ok to continue taking this way.

## 2020-12-22 RX ORDER — LOSARTAN POTASSIUM AND HYDROCHLOROTHIAZIDE 25; 100 MG/1; MG/1
1 TABLET ORAL DAILY
Qty: 90 TABLET | Refills: 3 | Status: SHIPPED | OUTPATIENT
Start: 2020-12-22 | End: 2021-07-01 | Stop reason: SDUPTHER

## 2021-05-11 ENCOUNTER — TELEPHONE (OUTPATIENT)
Dept: CARDIOLOGY CLINIC | Age: 71
End: 2021-05-11

## 2021-06-02 ENCOUNTER — HOSPITAL ENCOUNTER (OUTPATIENT)
Dept: NON INVASIVE DIAGNOSTICS | Age: 71
Discharge: HOME | End: 2021-06-02
Payer: MEDICARE

## 2021-06-02 DIAGNOSIS — I10 ESSENTIAL HYPERTENSION, BENIGN: ICD-10-CM

## 2021-06-02 DIAGNOSIS — I25.10 CORONARY ARTERY DISEASE INVOLVING NATIVE CORONARY ARTERY OF NATIVE HEART WITHOUT ANGINA PECTORIS: ICD-10-CM

## 2021-06-02 DIAGNOSIS — R01.1 MURMUR: ICD-10-CM

## 2021-06-02 LAB
LV EF: 60 %
LVEF MODALITY: NORMAL

## 2021-06-02 PROCEDURE — 93306 TTE W/DOPPLER COMPLETE: CPT
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
## 2021-06-30 NOTE — PATIENT INSTRUCTIONS
Increase crestor to 20 mg daily    Strongly discussed tobacco cessation with he and his wife    He has mild aortic stenosis, may consider repeating echo in 2 years    Recommend daily exercise- 150 minutes per week    Follow up with pcp regarding treatment of diabetes

## 2021-07-01 ENCOUNTER — OFFICE VISIT (OUTPATIENT)
Dept: CARDIOLOGY CLINIC | Age: 71
End: 2021-07-01
Payer: MEDICARE

## 2021-07-01 VITALS
HEIGHT: 66 IN | DIASTOLIC BLOOD PRESSURE: 66 MMHG | BODY MASS INDEX: 31.63 KG/M2 | OXYGEN SATURATION: 98 % | SYSTOLIC BLOOD PRESSURE: 130 MMHG | HEART RATE: 65 BPM | WEIGHT: 196.8 LBS

## 2021-07-01 DIAGNOSIS — N52.9 ERECTILE DYSFUNCTION, UNSPECIFIED ERECTILE DYSFUNCTION TYPE: ICD-10-CM

## 2021-07-01 DIAGNOSIS — I65.23 OCCLUSION AND STENOSIS OF BILATERAL CAROTID ARTERIES: ICD-10-CM

## 2021-07-01 DIAGNOSIS — E78.2 MIXED HYPERLIPIDEMIA: ICD-10-CM

## 2021-07-01 DIAGNOSIS — I35.0 AORTIC VALVE STENOSIS, ETIOLOGY OF CARDIAC VALVE DISEASE UNSPECIFIED: ICD-10-CM

## 2021-07-01 DIAGNOSIS — I10 ESSENTIAL HYPERTENSION, BENIGN: ICD-10-CM

## 2021-07-01 DIAGNOSIS — I25.10 ATHEROSCLEROSIS OF NATIVE CORONARY ARTERY OF NATIVE HEART WITHOUT ANGINA PECTORIS: Primary | ICD-10-CM

## 2021-07-01 DIAGNOSIS — I65.23 OBSTRUCTION OF CAROTID ARTERY, BILATERAL: ICD-10-CM

## 2021-07-01 PROCEDURE — 1123F ACP DISCUSS/DSCN MKR DOCD: CPT | Performed by: INTERNAL MEDICINE

## 2021-07-01 PROCEDURE — G8427 DOCREV CUR MEDS BY ELIG CLIN: HCPCS | Performed by: INTERNAL MEDICINE

## 2021-07-01 PROCEDURE — 4040F PNEUMOC VAC/ADMIN/RCVD: CPT | Performed by: INTERNAL MEDICINE

## 2021-07-01 PROCEDURE — 93000 ELECTROCARDIOGRAM COMPLETE: CPT | Performed by: INTERNAL MEDICINE

## 2021-07-01 PROCEDURE — 4004F PT TOBACCO SCREEN RCVD TLK: CPT | Performed by: INTERNAL MEDICINE

## 2021-07-01 PROCEDURE — 99214 OFFICE O/P EST MOD 30 MIN: CPT | Performed by: INTERNAL MEDICINE

## 2021-07-01 PROCEDURE — 3017F COLORECTAL CA SCREEN DOC REV: CPT | Performed by: INTERNAL MEDICINE

## 2021-07-01 PROCEDURE — G8417 CALC BMI ABV UP PARAM F/U: HCPCS | Performed by: INTERNAL MEDICINE

## 2021-07-01 RX ORDER — CLOPIDOGREL BISULFATE 75 MG/1
TABLET ORAL
Qty: 90 TABLET | Refills: 3 | Status: SHIPPED | OUTPATIENT
Start: 2021-07-01 | End: 2021-12-08 | Stop reason: SDUPTHER

## 2021-07-01 RX ORDER — LOSARTAN POTASSIUM AND HYDROCHLOROTHIAZIDE 25; 100 MG/1; MG/1
1 TABLET ORAL DAILY
Qty: 90 TABLET | Refills: 3 | Status: SHIPPED | OUTPATIENT
Start: 2021-07-01 | End: 2021-12-08 | Stop reason: SDUPTHER

## 2021-07-01 RX ORDER — ROSUVASTATIN CALCIUM 20 MG/1
20 TABLET, COATED ORAL DAILY
Qty: 90 TABLET | Refills: 3 | Status: SHIPPED | OUTPATIENT
Start: 2021-07-01 | End: 2021-12-08 | Stop reason: SDUPTHER

## 2021-07-01 RX ORDER — FENOFIBRATE 145 MG/1
145 TABLET, COATED ORAL DAILY
Qty: 90 TABLET | Refills: 3 | Status: SHIPPED | OUTPATIENT
Start: 2021-07-01 | End: 2021-12-08 | Stop reason: SDUPTHER

## 2021-07-01 NOTE — LETTER
[address] [place] [address] - [address]  [address] [facility] [address] [address]  Phone: [phone]  Fax: [phone]    Hui Guzman DO    August 26, 2021     Esmer Gonzalez, [facility]. [address] [id number]    Patient: Uzma Craven   MR Number: [id number]   YOB: 1950   Date of Visit: 7/1/2021       Dear Esmer Gonzalez:    Thank you for referring Fausto Valenzuela to me for evaluation/treatment. Below are the relevant portions of my assessment and plan of care. If you have questions, please do not hesitate to call me. I look forward to following Florinda Donovan along with you.     Sincerely,    Jose Lewis, DO Hui Guzman DO

## 2021-07-15 ASSESSMENT — ENCOUNTER SYMPTOMS
BLOOD IN STOOL: 0
CHEST TIGHTNESS: 0
SHORTNESS OF BREATH: 0
ABDOMINAL DISTENTION: 0
PHOTOPHOBIA: 0
COUGH: 0
ABDOMINAL PAIN: 0
NAUSEA: 0

## 2021-12-06 ASSESSMENT — ENCOUNTER SYMPTOMS
CHEST TIGHTNESS: 0
NAUSEA: 0
ABDOMINAL DISTENTION: 0
BLOOD IN STOOL: 0
ABDOMINAL PAIN: 0
COUGH: 0
PHOTOPHOBIA: 0
SHORTNESS OF BREATH: 0

## 2021-12-06 NOTE — PROGRESS NOTES
[address]  12/8/21  Referring: Dr. Dodge Handler CONSULT/CHIEF COMPLAINT/HPI     Reason for visit/ Chief complaint  Follow up   CAD   HPI Audley Merlin is a 66 y.o. seen as a yearly follow up for management of CAD, hypertension, hyperlipidemia. He has AS, carotid disease. He had radiation for prostate cancer. He is intolerant to lipitor due to leg cramps. He is on cialis for ED. Has history of elevated lead levels due to exposure to bullets. He is retired from [employer]. Has been instructed to sleep with oxygen 2 lpm at night. Today he is feeling well. Continues to work as an auctioneer. He quit smoking 2 weeks ago! Unfortunately his wife still smokes. . He has arthritic pain in his knees. He has no chest pain, shortness of breath palpitations or dizziness. He has no change in exercise tolerance. States his most recent PSA was \"undetectable\"  Complains of 6 month history of pain/fatigue in calves and feet when walking      Patient is compliant with medications and is tolerating them well without side effects     HISTORY/ALLERGIES/ROS     MedHx:  has a past medical history of Arthritis, Cancer ([facility] [address] Chronic kidney disease, Hyperlipidemia, Hypertension, and S/P radiation therapy. SurgHx:  has a past surgical history that includes Coronary angioplasty with stent and hernia repair. SocHx:  reports that he has been smoking pipe. He has smoked for the past 40.00 years. He has never used smokeless tobacco. He reports current alcohol use of about 2.0 standard drinks of alcohol per week. He reports current drug use. Drug: Marijuana Maurita Handsome). FamHx: Father with MI  Allergies: Statins   ROS:   Review of Systems   Constitutional: Positive for fatigue. Negative for activity change, diaphoresis and fever. HENT: Negative for congestion and ear discharge. Eyes: Negative for photophobia and visual disturbance. Respiratory: Negative for cough, chest tightness and shortness of breath. Cardiovascular: Negative for chest pain and palpitations. Gastrointestinal: Negative for abdominal distention, abdominal pain, blood in stool and nausea. Endocrine: Negative for cold intolerance and polydipsia. Genitourinary: Negative for difficulty urinating and flank pain. Musculoskeletal: Positive for arthralgias and myalgias. Skin: Negative for rash and wound. Allergic/Immunologic: Negative for environmental allergies and immunocompromised state. Neurological: Negative for dizziness and headaches. Hematological: Negative for adenopathy. Does not bruise/bleed easily. Psychiatric/Behavioral: Negative for confusion. The patient is not hyperactive. MEDICATIONS      Prior to Admission medications    Medication Sig Start Date End Date Taking?  Authorizing Provider   ferrous sulfate (IRON 325) 325 (65 Fe) MG tablet Take 1 tablet by mouth daily (with breakfast) 8/11/21 12/8/21 Yes Swapnil Cabrera MD   rosuvastatin (CRESTOR) 20 MG tablet Take 1 tablet by mouth daily  Patient taking differently: Take 30 mg by mouth daily  7/1/21  Yes Conor Robles DO   losartan-hydroCHLOROthiazide [facility]) 100-25 MG per tablet Take 1 tablet by mouth daily 7/1/21  Yes Conor Robles DO   clopidogrel (PLAVIX) 75 MG tablet TAKE 1 TABLET BY MOUTH ONCE DAILY 7/1/21  Yes Conor Robles DO   metoprolol tartrate (LOPRESSOR) 25 MG tablet Take 1 tablet by mouth 2 times daily 7/1/21  Yes Conor Robles DO   fenofibrate (TRICOR) 145 MG tablet Take 1 tablet by mouth daily 7/1/21  Yes Conor Robles DO   tadalafil (CIALIS) 5 MG tablet TAKE ONE TABLET BY MOUTH DAILY AS NEEDED  FOR ERECTILE DYSFUNCTION 12/16/20  Yes Simone Trinidad MD   Coenzyme Q10 (COQ10) 400 MG CAPS Take 1 capsule by mouth   Yes Historical Provider, MD   aspirin EC 81 MG EC tablet Take 81 mg by mouth daily   Yes Historical Provider, MD       PHYSICAL EXAM        Vitals:    12/08/21 1124   BP: 130/70   Pulse: 60   SpO2: 96%    Weight: 205 lb 6.4 oz (93.2 kg)     Gen Alert, cooperative, no distress Heart  Regular rate and rhythm, 2/6 systolic murmur   Head Normocephalic, atraumatic, no abnormalities Abd  Soft, NT, +BS, no mass, no OM   Eyes PERRLA, conj/corn clear Ext  Ext nl, AT, no C/C, no edema   Nose Nares normal, no drain age, Non-tender Pulse 1+ and symmetric   Throat Lips, mucosa, tongue normal Skin Color/text/turg nl, no rash/lesions   Neck S/S, TM, NT, no bruit Psych Nl mood and affect   Lung  CTA-B, unlabored, no DTP     Ch wall NT, no deform       LABS and Imaging     Relevant and available CV data reviewed  Echo/MRI: 6/2/2021  Normal left ventricle size, wall thickness, and systolic function with an   estimated ejection fraction of 60%. -No regional wall motion abnormalities are seen. -Mild aortic stenosis with a peak velocity of 2.39 m/s and a mean pressure gradient of 10 mmHg. The aortic valve area is estimated at 1.48 cm2. No significant regurgitation.   -Normal diastolic function. Avg. E/e'=10.3  Cath: 2011  Double vessel CAD involving the RCA and LAD--cypher stent to RCA  Holter: none  EKG  7/2/2021: personally reviewed and interpreted by me: sinus bradycardia, inferior infarct, nonspecific st-t wave changes  Stress:12/7/20   Small sized inferior fixed defect of moderate intensity consistent with    infarction in the territory of the distal RCA .    Normal LV size and systolic function. moderate Risk  moderate Complexity/Medical Decision Making  Outside records Reviewed  Labs Reviewed  Prior Imaging, ekg, cath, echo reviewed when available  Medications reviewed  Old Notes reviewed  ASSESSMENT AND PLAN     1.CAD  - 2007 POLLY RCA  - stable  Plan  - continue  Asa, plavix, crestor. Previously discussed prolonged DAPT with Dr. Marion Mueller and risk of fatal hemorrhage and death  - avoidance of all tobacco products    2. Diabetes  - hgA1c 6.7  - stable  Plan  - follow up with pcp for tx, would benefit from sglt2    3.  Nocturnal hypoxia  - previously unwilling to do a sleep study  -stable  Plan  - sleeps with oxygen 2 lpm per NC    4. Hypertension  - 130/70  -8/4/2021  k 3.7 bun 24 creat 1.06  -stable  plan  - continue hyzaar 100/25 mg lopressor 25 mg bid    5. Hypertensive kidney disease  - stage 3a CKD  - renal cyst, anemia  - 8/4/2021  k 3.7 bun 24 creat 1.06  -stable  fredrick  - follow with Dr Barbara Tomlinson    6. Anemia  - ckd  - 8/4/2021  hgb 11.2 hct 34.8  - no visible blood loss  - dark stools  - new problem  Plan  - continue ferrous sulfate 325 mg daily  - due for colonoscopy- referred to Dr Loretta Hodges for evaluation of anemia. Can hold plavix if needed for colonscopy    7. Mixed Hyperlipidemia  - 2/2/21  Tc 178  Tri 198  ldl 101 hdl 43 ( on crestor 10 and tricor)  - intolerant to lipitor due to muscle cramps in legs  -stable  Plan  - continue crestor 30 mg and tricor, add zetia/pcsk9 if still high      8. Carotid disease  - 7/14/2020  Plan  - followed by Dr Belle Galindo      9. Tobacco use   - quit smoking 2 weeks ago!  - discussed importance of cessation  - exposed to second hand smoke--his wife smokes  Plan  - discussed importance of remaining tobacco free    10. AS  - 6/2021  Mild AS  Plan  - will continue to monitor, repeat echo in 3 years    6. Claudication  - new problem  Plan  - VINITA  - avoidance of tobacco    12. Overweight  Plan  -- patient would like to lose 20 pounds in next year    Patient counseled on lifestyle modification, diet, and exercise. Follow Up:   12 months    Dr. Jane [place]:  I, Sanaz Perrin am scribing for and in the presence of Doug Buchanan MD.   Domitila Sanders 12/08/21 11:50 AM   Physician Attestation  The scribe for and in the presence of fabrice Esparza DO). The scribe Phu Jacobs may have prepopulated components of this note with my historical  intellectual property under my direct supervision.   Any additions to this intellectual property were performed in my presence and at my direction. Furthermore, the content and accuracy of this note have been reviewed by me Yudith Quispe DO).   12/8/2021 11:50 AM

## 2021-12-07 NOTE — PATIENT INSTRUCTIONS
Congratulations on quitting smoking  Recommend getting VINITA's to evaluate leg fatigue and pain  Call for any changes  20 pound weight loss goal next visit

## 2021-12-08 ENCOUNTER — OFFICE VISIT (OUTPATIENT)
Dept: CARDIOLOGY CLINIC | Age: 71
End: 2021-12-08
Payer: MEDICARE

## 2021-12-08 VITALS
BODY MASS INDEX: 33.01 KG/M2 | OXYGEN SATURATION: 96 % | HEIGHT: 66 IN | HEART RATE: 60 BPM | WEIGHT: 205.4 LBS | DIASTOLIC BLOOD PRESSURE: 70 MMHG | SYSTOLIC BLOOD PRESSURE: 130 MMHG

## 2021-12-08 DIAGNOSIS — E78.5 HYPERLIPIDEMIA, UNSPECIFIED HYPERLIPIDEMIA TYPE: ICD-10-CM

## 2021-12-08 DIAGNOSIS — I10 ESSENTIAL HYPERTENSION, BENIGN: ICD-10-CM

## 2021-12-08 DIAGNOSIS — I65.23 OBSTRUCTION OF CAROTID ARTERY, BILATERAL: ICD-10-CM

## 2021-12-08 DIAGNOSIS — I35.0 AORTIC VALVE STENOSIS, ETIOLOGY OF CARDIAC VALVE DISEASE UNSPECIFIED: ICD-10-CM

## 2021-12-08 DIAGNOSIS — N52.9 ERECTILE DYSFUNCTION, UNSPECIFIED ERECTILE DYSFUNCTION TYPE: ICD-10-CM

## 2021-12-08 DIAGNOSIS — I65.23 OCCLUSION AND STENOSIS OF BILATERAL CAROTID ARTERIES: ICD-10-CM

## 2021-12-08 DIAGNOSIS — E78.2 MIXED HYPERLIPIDEMIA: ICD-10-CM

## 2021-12-08 DIAGNOSIS — D64.9 ANEMIA, UNSPECIFIED TYPE: ICD-10-CM

## 2021-12-08 DIAGNOSIS — I25.10 ATHEROSCLEROSIS OF NATIVE CORONARY ARTERY OF NATIVE HEART WITHOUT ANGINA PECTORIS: Primary | ICD-10-CM

## 2021-12-08 DIAGNOSIS — I73.9 CLAUDICATION (HCC): ICD-10-CM

## 2021-12-08 PROCEDURE — G8417 CALC BMI ABV UP PARAM F/U: HCPCS | Performed by: INTERNAL MEDICINE

## 2021-12-08 PROCEDURE — 4004F PT TOBACCO SCREEN RCVD TLK: CPT | Performed by: INTERNAL MEDICINE

## 2021-12-08 PROCEDURE — 99214 OFFICE O/P EST MOD 30 MIN: CPT | Performed by: INTERNAL MEDICINE

## 2021-12-08 PROCEDURE — G8484 FLU IMMUNIZE NO ADMIN: HCPCS | Performed by: INTERNAL MEDICINE

## 2021-12-08 PROCEDURE — G8427 DOCREV CUR MEDS BY ELIG CLIN: HCPCS | Performed by: INTERNAL MEDICINE

## 2021-12-08 PROCEDURE — 1123F ACP DISCUSS/DSCN MKR DOCD: CPT | Performed by: INTERNAL MEDICINE

## 2021-12-08 PROCEDURE — 4040F PNEUMOC VAC/ADMIN/RCVD: CPT | Performed by: INTERNAL MEDICINE

## 2021-12-08 PROCEDURE — 3017F COLORECTAL CA SCREEN DOC REV: CPT | Performed by: INTERNAL MEDICINE

## 2021-12-08 RX ORDER — FENOFIBRATE 145 MG/1
145 TABLET, COATED ORAL DAILY
Qty: 90 TABLET | Refills: 3 | Status: SHIPPED | OUTPATIENT
Start: 2021-12-08 | End: 2022-10-21

## 2021-12-08 RX ORDER — LOSARTAN POTASSIUM AND HYDROCHLOROTHIAZIDE 25; 100 MG/1; MG/1
1 TABLET ORAL DAILY
Qty: 90 TABLET | Refills: 3 | Status: SHIPPED | OUTPATIENT
Start: 2021-12-08 | End: 2022-10-31 | Stop reason: SDUPTHER

## 2021-12-08 RX ORDER — CLOPIDOGREL BISULFATE 75 MG/1
TABLET ORAL
Qty: 90 TABLET | Refills: 3 | Status: SHIPPED | OUTPATIENT
Start: 2021-12-08 | End: 2022-10-31 | Stop reason: SDUPTHER

## 2021-12-08 RX ORDER — ROSUVASTATIN CALCIUM 20 MG/1
20 TABLET, COATED ORAL DAILY
Qty: 90 TABLET | Refills: 3 | Status: SHIPPED | OUTPATIENT
Start: 2021-12-08 | End: 2022-07-07 | Stop reason: SDUPTHER

## 2021-12-08 NOTE — LETTER
[facility] Cardiology - [facility]  [address] [address]  Phone: [phone]  Fax: [phone]    Luke Guerreir DO    December 12, 2021     [facility] [address] [id number]    Patient: Lamine Pantoja   MR Number: [id number]   YOB: 1950   Date of Visit: 12/8/2021       Dear Mendoza Lowry:    Thank you for referring Nick Ragsdale to me for evaluation/treatment. Below are the relevant portions of my assessment and plan of care. If you have questions, please do not hesitate to call me. I look forward to following Estuardo Carrera along with you.     Sincerely,      Luke Guerrier DO

## 2022-01-12 ENCOUNTER — HOSPITAL ENCOUNTER (OUTPATIENT)
Dept: VASCULAR LAB | Age: 72
Discharge: HOME | End: 2022-01-12
Payer: MEDICARE

## 2022-01-12 DIAGNOSIS — I73.9 CLAUDICATION (HCC): ICD-10-CM

## 2022-01-12 PROCEDURE — 93922 UPR/L XTREMITY ART 2 LEVELS: CPT

## 2022-02-11 LAB
A/G RATIO: 1.1 (ref 1–2)
ALBUMIN SERPL-MCNC: 3.7 G/DL (ref 3.5–5.7)
ALBUMIN/PROTEIN TOTAL, SER/PL: 7.1 G/DL (ref 6–8.3)
ALP BLD-CCNC: 62 U/L (ref 34–104)
ALT SERPL-CCNC: 23 U/L (ref 7–52)
ANION GAP 4: 8 MMOL/L (ref 7–16)
AST W/O P-5'-P: 30 U/L (ref 15–39)
BILIRUB SERPL-MCNC: 0.6 MG/DL (ref 0.3–1)
BUN BLDV-MCNC: 18 MG/DL (ref 7–25)
CALCIUM SERPL-MCNC: 9.9 MG/DL (ref 8.6–10.2)
CHLORIDE BLD-SCNC: 100 MMOL/L (ref 98–107)
CO2: 33 MMOL/L (ref 21–31)
CREATININE + EGFR PANEL: 1.1 MG/DL (ref 0.7–1.3)
CREATININE URINE: 67.96 MG/DL
GFR CALCULATED: > 60 ML/MIN/1.73M2
GFR CALCULATED: > 60 ML/MIN/1.73M2
GLOBULIN: 3.4 G/DL (ref 2.6–4.2)
GLUCOSE: 108 MG/DL (ref 74–109)
IRON SATURATION: 32 % (ref 20–40)
IRON: 95 UG/DL (ref 65–175)
POTASSIUM SERPL-SCNC: 3.7 MMOL/L (ref 3.5–5.3)
PROTEIN UA: 9 MG/DL
PROTEIN/CREAT RATIO URINE RAN: 0.1 MG/DL (ref 0–0.1)
SODIUM BLD-SCNC: 141 MMOL/L (ref 136–145)
TOTAL IRON BINDING CAPACITY: 301 UG/DL (ref 250–450)
UNSATURATED IRON BINDING CAPACITY: 206 UG/DL (ref 155–355)

## 2022-05-31 DIAGNOSIS — I12.9 HYPERTENSIVE KIDNEY DISEASE: ICD-10-CM

## 2022-05-31 LAB
A/G RATIO: 1.5 (ref 1.1–2.2)
ALBUMIN SERPL-MCNC: 4.2 G/DL (ref 3.4–5)
ALP BLD-CCNC: 75 U/L (ref 40–129)
ALT SERPL-CCNC: 24 U/L (ref 10–40)
ANION GAP SERPL CALCULATED.3IONS-SCNC: 16 MMOL/L (ref 3–16)
AST SERPL-CCNC: 25 U/L (ref 15–37)
BILIRUB SERPL-MCNC: 0.6 MG/DL (ref 0–1)
BUN BLDV-MCNC: 19 MG/DL (ref 7–20)
CALCIUM SERPL-MCNC: 10.1 MG/DL (ref 8.3–10.6)
CHLORIDE BLD-SCNC: 101 MMOL/L (ref 99–110)
CO2: 27 MMOL/L (ref 21–32)
CREAT SERPL-MCNC: 1 MG/DL (ref 0.8–1.3)
CREATININE URINE: 59.5 MG/DL (ref 39–259)
GFR AFRICAN AMERICAN: >60
GFR NON-AFRICAN AMERICAN: >60
GLUCOSE BLD-MCNC: 120 MG/DL (ref 70–99)
HCT VFR BLD CALC: 38.7 % (ref 40.5–52.5)
HEMOGLOBIN: 12.9 G/DL (ref 13.5–17.5)
MCH RBC QN AUTO: 29.9 PG (ref 26–34)
MCHC RBC AUTO-ENTMCNC: 33.3 G/DL (ref 31–36)
MCV RBC AUTO: 89.8 FL (ref 80–100)
PDW BLD-RTO: 14.9 % (ref 12.4–15.4)
PLATELET # BLD: 188 K/UL (ref 135–450)
PMV BLD AUTO: 8 FL (ref 5–10.5)
POTASSIUM SERPL-SCNC: 4 MMOL/L (ref 3.5–5.1)
PROTEIN PROTEIN: 7 MG/DL
PROTEIN/CREAT RATIO: 0.1 MG/DL
RBC # BLD: 4.31 M/UL (ref 4.2–5.9)
SODIUM BLD-SCNC: 144 MMOL/L (ref 136–145)
TOTAL PROTEIN: 7 G/DL (ref 6.4–8.2)
WBC # BLD: 7.1 K/UL (ref 4–11)

## 2022-07-05 NOTE — TELEPHONE ENCOUNTER
Received refill request for Crestor from [address] [place] [address].     Last ov: 12/08/2021 DKW    Last labs: 03/22/2022 (care everywhere)    Last Refill: 12/08/2021 #90 w/ 3 refills     Last ov: 11/23/2022 DKW

## 2022-07-07 RX ORDER — ROSUVASTATIN CALCIUM 20 MG/1
20 TABLET, COATED ORAL DAILY
Qty: 90 TABLET | Refills: 1 | Status: SHIPPED | OUTPATIENT
Start: 2022-07-07 | End: 2022-10-31 | Stop reason: SDUPTHER

## 2022-07-07 RX ORDER — ROSUVASTATIN CALCIUM 20 MG/1
20 TABLET, COATED ORAL DAILY
Qty: 90 TABLET | Refills: 1 | OUTPATIENT
Start: 2022-07-07

## 2022-07-20 RX ORDER — ROSUVASTATIN CALCIUM 20 MG/1
TABLET, COATED ORAL
Qty: 90 TABLET | Refills: 0 | OUTPATIENT
Start: 2022-07-20

## 2022-09-27 NOTE — DISCHARGE INSTR - OTHER ORDERS
[facility] is participating in [address] [place] [address] for Joint Replacement (CJR) Ashraf Eden Silva [facility] is participating in a Medicare initiative called the [address] [place] [address] for Joint Replacement (CJR) model. Medicare designed this model to encourage higher quality care and greater financial accountability from hospitals when Medicare beneficiaries receive lower-extremity joint replacement procedures (Godwin Rakesh), typically hip or knee replacements. [facility] participation in the [address] [place] [address] should not restrict your access to care for your medical condition or your freedom to choose your health care providers and services. All existing Medicare beneficiary protections continue to be available to you. The CJR model aims to help give you better care. The CJR model aims to support better and more efficient care for beneficiaries undergoing LEJR procedures. A CJR episode of care is typically defined as an admission of an eligible Medicare beneficiary to a hospital participating in the [address] [place] [address] for an Godwin Rakesh procedure. The LEJR procedure can take place in either an inpatient or outpatient setting and will still be considered a CJR episode of care. The CJR episode of care continues for 90 days following discharge. This model uses episode payment and quality measurement for an episode of care associated with LEJR procedures to encourage hospitals, physicians, and post-acute care providers to work together to improve the quality and coordination of care from the initial hospitalization through recovery. Under the [address] [place] [address],[address] [place] [address], [facility] can earn additional payments from Medicare if we meet the high quality goals set by Medicare, while keeping hospital costs and care spending under control. If we dont meet these quality and cost goals, we may have to repay Medicare.      Medicare is using the CJR model to encourage [facility] [address] to work more closely with your doctors and other health care providers that help patients recover after discharge from the hospital including, but not limited to, nursing homes (skilled nursing facilities), home health agencies, inpatient rehabilitation facilities, and long- term care hospitals. If you require a stay in a [facility] (SNF) to assist with your recovery from surgery and if, and only if, it is clinically appropriate, the CJR model permits [facility] to discharge you to a high quality SNF sooner than the three days Medicare usually requires to cover a SNF stay. Medicare will monitor your care to ensure you and others are receiving high quality care. It's your choice which hospital, doctor, or other providers you use. You have the right to choose which hospital, doctor, or other post-hospital stay health care provider you use. If you believe that your care is adversely affected or have concerns about substandard care, you may call 1-800-MEDICARE or contact your states Quality Improvement Organization by going to: Chance (app).[employer]. To find a different doctor, visit [address] Physician Compare website, [employer].S[employer] TrailMaps.[employer], or call 1-800-MEDICARE (1-953.713.2516). TTY users should call 2-657.970.7699. To find a different hospital, visit OrderAheadbe, or  call 1-800-MEDICARE (1- 511.383.7459). TTY users should call  5-697.122.6876. To find a different skilled nursing facility, visit [address] website, https://www."[email or web address]/, or call  1-800-MEDICARE (5-275.540.4561). TTY users should call 4-648-914- 5540. To find a different home health agency, visit [address] [place] [address] website, Aprovecha.com.co.uk, or call 1-800-MEDICARE (0-416.359.9692). TTY users should call 2-306-020- 4793. For an explanation of how patients can access their health care records and beneficiary claims data, please visit Maisha.[email or web address]-button    Get more information     If you have questions or want more information about the Comprehensive Care for Joint Replacement (CJR) model, call [facility] at 701-829-5915 or call 1-800-MEDICARE. You can also find additional information at https://innovation.cms.gov/initiatives/cjr.

## 2022-10-21 NOTE — PROGRESS NOTES
Name_______________________________________Printed:____________________  Date and time of surgery__11/14 1000______________________Arrival Time:__08000______________   1. The instructions given regarding when and if a patient needs to stop oral intake prior to surgery varies. Follow the specific instructions you were given                  __x_Nothing to eat or to drink after Midnight the night before.                   ____Carbo loading or ERAS instructions will be given to select patients-if you have been given those instructions -please do the following                           The evening before your surgery after dinner before midnight drink 40 ounces of gatorade. If you are diabetic use sugar free. The morning of surgery drink 40 ounces of water. This needs to be finished 3 hours prior to your surgery start time. 2. Take the following pills with a small sip of water on the morning of surgery__metoprolol_________________________________________________                  Do not take blood pressure medications ending in pril or sartan the yamel prior to surgery or the morning of surgery_   3. Aspirin, Ibuprofen, Advil, Naproxen, Vitamin E and other Anti-inflammatory products and supplements should be stopped for 5 -7days before surgery or as directed by your physician. 4. Check with your Doctor regarding stopping Plavix, Coumadin,Eliquis, Lovenox,Effient,Pradaxa,Xarelto, Fragmin or other blood thinners and follow their instructions. ASA and PLAVIX-check with Dr Maty Acosta and cardiologist   5. Do not smoke, and do not drink any alcoholic beverages 24 hours prior to surgery. This includes NA Beer. Refrain from the usage of any recreational drugs. 6. You may brush your teeth and gargle the morning of surgery. DO NOT SWALLOW WATER   7. You MUST make arrangements for a responsible adult to stay on site while you are here and take you home after your surgery.  You will not be allowed to leave alone or drive yourself home. It is strongly suggested someone stay with you the first 24 hrs. Your surgery will be cancelled if you do not have a ride home. 8. A parent/legal guardian must accompany a child scheduled for surgery and plan to stay at the hospital until the child is discharged. Please do not bring other children with you. 9. Please wear simple, loose fitting clothing to the hospital.  Kailash Mtz not bring valuables (money, credit cards, checkbooks, etc.) Do not wear any makeup (including no eye makeup) or nail polish on your fingers or toes. 10. DO NOT wear any jewelry or piercings on day of surgery. All body piercing jewelry must be removed. 11. If you have ___dentures, they will be removed before going to the OR; we will provide you a container. If you wear ___contact lenses or ___glasses, they will be removed; please bring a case for them. 12. Please see your family doctor/pediatrician for a history & physical and/or concerning medications. Bring any test results/reports from your physician's office. PCP__________________Phone___________H&P Appt. Date________             13 If you  have a Living Will and Durable Power of  for Healthcare, please bring in a copy. 15. Notify your Surgeon if you develop any illness between now and surgery  time, cough, cold, fever, sore throat, nausea, vomiting, etc.  Please notify your surgeon if you experience dizziness, shortness of breath or blurred vision between now & the time of your surgery             15. DO NOT shave your operative site 96 hours prior to surgery. For face & neck surgery, men may use an electric razor 48 hours prior to surgery. 16. Shower the night before or morning of surgery using an antibacterial soap or as you have been instructed. 17. To provide excellent care visitors will be limited to one in the room at any given time.              18.  Please bring picture ID and insurance card. 19.  Visit our web site for additional information:  [email or web address]              20.During flu season no children under the age of 15 are permitted in the hospital for the safety of all patients. 21. If you take a long acting insulin in the evening only  take half of your usual  dose the night  before your procedure              22. If you use a c-pap please bring DOS if staying overnight,             23.For your convenience [facility] has a pharmacy on site to fill your prescriptions. 24. If you use oxygen and have a portable tank please bring it  with you the DOS             25. Bring a complete list of all your medications with name and dose include any supplements. 26. Other__PAT/sopa/folfer by 11/1  PCP and Vitor Ballard 10/31________________________________________   *Please call pre admission testing if you any further questions   Cal Thompsonvangelicaet     [facility]. [facility]  [phone]   [address] [place] [address]       VISITOR POLICY(subject to change)    Current policy is 2 visitors per patient. No children. Mask is  at the discretion of the facility. Visiting hours are 8a-8p. Overnight visitors will be at the discretion of the nurse. All policies subject to change. All above information reviewed with patient in person or by phone. Patient verbalizes understanding. All questions and concerns addressed.                                                                                                  Patient/Rep_per phone patient and wife___________________                                                                                                                                    PRE OP INSTRUCTIONS

## 2022-10-26 ENCOUNTER — HOSPITAL ENCOUNTER (OUTPATIENT)
Age: 72
Discharge: HOME | End: 2022-10-26
Payer: MEDICARE

## 2022-10-26 DIAGNOSIS — Z01.818 PREOP TESTING: ICD-10-CM

## 2022-10-26 LAB
ABO/RH: NORMAL
ANION GAP SERPL CALCULATED.3IONS-SCNC: 10 MMOL/L (ref 3–16)
ANTIBODY SCREEN: NORMAL
APTT: 29.5 SEC (ref 23–34.3)
BASOPHILS ABSOLUTE: 0 K/UL (ref 0–0.2)
BASOPHILS RELATIVE PERCENT: 0.5 %
BILIRUBIN URINE: NEGATIVE
BLOOD, URINE: NEGATIVE
BUN BLDV-MCNC: 21 MG/DL (ref 7–20)
CALCIUM SERPL-MCNC: 9.8 MG/DL (ref 8.3–10.6)
CHLORIDE BLD-SCNC: 101 MMOL/L (ref 99–110)
CLARITY: CLEAR
CO2: 28 MMOL/L (ref 21–32)
COLOR: YELLOW
CREAT SERPL-MCNC: 1.1 MG/DL (ref 0.8–1.3)
EOSINOPHILS ABSOLUTE: 0.2 K/UL (ref 0–0.6)
EOSINOPHILS RELATIVE PERCENT: 2.7 %
GFR SERPL CREATININE-BSD FRML MDRD: >60 ML/MIN/{1.73_M2}
GLUCOSE BLD-MCNC: 161 MG/DL (ref 70–99)
GLUCOSE URINE: NEGATIVE MG/DL
HCT VFR BLD CALC: 39.6 % (ref 40.5–52.5)
HEMOGLOBIN: 13 G/DL (ref 13.5–17.5)
INR BLD: 1.05 (ref 0.87–1.14)
KETONES, URINE: NEGATIVE MG/DL
LEUKOCYTE ESTERASE, URINE: NEGATIVE
LYMPHOCYTES ABSOLUTE: 1.9 K/UL (ref 1–5.1)
LYMPHOCYTES RELATIVE PERCENT: 30.2 %
MCH RBC QN AUTO: 29.1 PG (ref 26–34)
MCHC RBC AUTO-ENTMCNC: 32.8 G/DL (ref 31–36)
MCV RBC AUTO: 88.8 FL (ref 80–100)
MICROSCOPIC EXAMINATION: NORMAL
MONOCYTES ABSOLUTE: 0.4 K/UL (ref 0–1.3)
MONOCYTES RELATIVE PERCENT: 6.5 %
NEUTROPHILS ABSOLUTE: 3.7 K/UL (ref 1.7–7.7)
NEUTROPHILS RELATIVE PERCENT: 60.1 %
NITRITE, URINE: NEGATIVE
PDW BLD-RTO: 15.2 % (ref 12.4–15.4)
PH UA: 6 (ref 5–8)
PLATELET # BLD: 188 K/UL (ref 135–450)
PMV BLD AUTO: 8 FL (ref 5–10.5)
POTASSIUM SERPL-SCNC: 3.9 MMOL/L (ref 3.5–5.1)
PROTEIN UA: NEGATIVE MG/DL
PROTHROMBIN TIME: 13.6 SEC (ref 11.7–14.5)
RBC # BLD: 4.46 M/UL (ref 4.2–5.9)
SODIUM BLD-SCNC: 139 MMOL/L (ref 136–145)
SPECIFIC GRAVITY UA: 1.02 (ref 1–1.03)
URINE TYPE: NORMAL
UROBILINOGEN, URINE: 1 E.U./DL
WBC # BLD: 6.1 K/UL (ref 4–11)

## 2022-10-26 PROCEDURE — 86901 BLOOD TYPING SEROLOGIC RH(D): CPT

## 2022-10-26 PROCEDURE — 85730 THROMBOPLASTIN TIME PARTIAL: CPT

## 2022-10-26 PROCEDURE — 93005 ELECTROCARDIOGRAM TRACING: CPT | Performed by: ORTHOPAEDIC SURGERY

## 2022-10-26 PROCEDURE — 86900 BLOOD TYPING SEROLOGIC ABO: CPT

## 2022-10-26 PROCEDURE — 80048 BASIC METABOLIC PNL TOTAL CA: CPT

## 2022-10-26 PROCEDURE — 87086 URINE CULTURE/COLONY COUNT: CPT

## 2022-10-26 PROCEDURE — 85025 COMPLETE CBC W/AUTO DIFF WBC: CPT

## 2022-10-26 PROCEDURE — 85610 PROTHROMBIN TIME: CPT

## 2022-10-26 PROCEDURE — 36415 COLL VENOUS BLD VENIPUNCTURE: CPT

## 2022-10-26 PROCEDURE — 87081 CULTURE SCREEN ONLY: CPT

## 2022-10-26 PROCEDURE — 81003 URINALYSIS AUTO W/O SCOPE: CPT

## 2022-10-26 PROCEDURE — 86850 RBC ANTIBODY SCREEN: CPT

## 2022-10-27 LAB
EKG ATRIAL RATE: 52 BPM
EKG DIAGNOSIS: NORMAL
EKG P AXIS: 59 DEGREES
EKG P-R INTERVAL: 174 MS
EKG Q-T INTERVAL: 454 MS
EKG QRS DURATION: 102 MS
EKG QTC CALCULATION (BAZETT): 422 MS
EKG R AXIS: 10 DEGREES
EKG T AXIS: 187 DEGREES
EKG VENTRICULAR RATE: 52 BPM
URINE CULTURE, ROUTINE: NORMAL

## 2022-10-27 PROCEDURE — 93010 ELECTROCARDIOGRAM REPORT: CPT | Performed by: INTERNAL MEDICINE

## 2022-10-28 LAB — MRSA CULTURE ONLY: NORMAL

## 2022-10-30 ASSESSMENT — ENCOUNTER SYMPTOMS
NAUSEA: 0
SHORTNESS OF BREATH: 0
CHEST TIGHTNESS: 0
COUGH: 0
ABDOMINAL DISTENTION: 0
BLOOD IN STOOL: 0
ABDOMINAL PAIN: 0
PHOTOPHOBIA: 0

## 2022-10-30 NOTE — PROGRESS NOTES
[address]  10/31/22  Referring: Dr. Cota Brought CONSULT/CHIEF COMPLAINT/HPI     Reason for visit/ Chief complaint  Follow up   CAD   HPI Mckenna Cheema is a 67 y.o. seen as a yearly follow up for clearance for knee replacement. He has a history of CAD, hypertension, hyperlipidemia. He has AS, carotid disease. He had radiation for prostate cancer. He is intolerant to lipitor due to leg cramps. He is on cialis for ED. Has history of elevated lead levels due to exposure to bullets. He is retired from [employer]. Has been instructed to sleep with oxygen 2 lpm at night. Not yet had a sleep study. Last visit we advised him to have a colonoscopy to  evaluate dark stools. He did not have this done. States his stools are no longer dark. Today he is here with his wife. He occasionally smokes  a pipe, exposed to second hand smoke with his wife who still smokes. He has unchanged exertional shortness of breath. No chest pain, palpitations or dizziness. Exercise limited by  knee pain. No routine exercise. He cannot walk up a flight of stairs or 4 blocks. Patient is compliant with medications and is tolerating them well without side effects     HISTORY/ALLERGIES/ROS     MedHx:  has a past medical history of Arthritis, CAD (coronary artery disease), Cancer ([facility] [address] Chronic kidney disease, Hyperlipidemia, Hypertension, and S/P radiation therapy. SurgHx:  has a past surgical history that includes Coronary angioplasty with stent and hernia repair. SocHx:  reports that he has been smoking pipe. He has never used smokeless tobacco. He reports current alcohol use of about 2.0 standard drinks per week. He reports that he does not use drugs. FamHx: Father with MI  Allergies: Statins   ROS:   Review of Systems   Constitutional:  Positive for fatigue. Negative for activity change, diaphoresis and fever. HENT:  Negative for congestion and ear discharge.     Eyes:  Negative for photophobia and visual disturbance. Respiratory:  Negative for cough, chest tightness and shortness of breath. Cardiovascular:  Negative for chest pain and palpitations. Gastrointestinal:  Negative for abdominal distention, abdominal pain, blood in stool and nausea. Endocrine: Negative for cold intolerance and polydipsia. Genitourinary:  Negative for difficulty urinating and flank pain. Musculoskeletal:  Positive for arthralgias and myalgias. Skin:  Negative for rash and wound. Allergic/Immunologic: Negative for environmental allergies and immunocompromised state. Neurological:  Negative for dizziness and headaches. Hematological:  Negative for adenopathy. Does not bruise/bleed easily. Psychiatric/Behavioral:  Negative for confusion. The patient is not hyperactive. MEDICATIONS      Prior to Admission medications    Medication Sig Start Date End Date Taking?  Authorizing Provider   Ferrous Sulfate (IRON) 325 (65 Fe) MG TABS Take 1 tablet by mouth once daily with breakfast 8/22/22  Yes Swapnil Cabrera MD   rosuvastatin (CRESTOR) 20 MG tablet Take 1 tablet by mouth daily 7/7/22  Yes Danielle Quispe DO   calcium carbonate 1500 (600 Ca) MG TABS tablet Take 600 mg by mouth daily   Yes Historical Provider, MD   losartan-hydroCHLOROthiazide (HYZAAR) 100-25 MG per tablet Take 1 tablet by mouth daily 12/8/21  Yes Danielle Quispe DO   clopidogrel (PLAVIX) 75 MG tablet TAKE 1 TABLET BY MOUTH ONCE DAILY 12/8/21  Yes Danielle Quispe DO   metoprolol tartrate (LOPRESSOR) 25 MG tablet Take 1 tablet by mouth 2 times daily  Patient taking differently: Take 25 mg by mouth daily 12/8/21  Yes Danielle Quispe DO   tadalafil (CIALIS) 5 MG tablet TAKE ONE TABLET BY MOUTH DAILY AS NEEDED  FOR ERECTILE DYSFUNCTION 12/16/20  Yes tJ Cosby MD   Coenzyme Q10 (COQ10) 400 MG CAPS Take 1 capsule by mouth   Yes Historical Provider, MD   aspirin EC 81 MG EC tablet Take 81 mg by mouth daily   Yes Historical Provider, MD       PHYSICAL EXAM Vitals:    10/31/22 0900   BP: 124/62   Pulse: 50   SpO2: 95%    Weight: 204 lb 14.4 oz (92.9 kg)     Gen Alert, cooperative, no distress Heart  Regular rate and rhythm, 2/6 systolic murmur   Head Normocephalic, atraumatic, no abnormalities Abd  Soft, NT, +BS, no mass, no OM   Eyes PERRLA, conj/corn clear Ext  Ext nl, AT, no C/C, no edema   Nose Nares normal, no drain age, Non-tender Pulse 1+ and symmetric   Throat Lips, mucosa, tongue normal Skin Color/text/turg nl, no rash/lesions   Neck S/S, TM, NT, no bruit Psych Nl mood and affect   Lung  CTA-B, unlabored, no DTP     Ch wall NT, no deform       LABS and Imaging     Relevant and available CV data reviewed  Echo/MRI: 6/2/2021  Normal left ventricle size, wall thickness, and systolic function with an   estimated ejection fraction of 60%. -No regional wall motion abnormalities are seen. -Mild aortic stenosis with a peak velocity of 2.39 m/s and a mean pressure gradient of 10 mmHg. The aortic valve area is estimated at 1.48 cm2. No significant regurgitation.   -Normal diastolic function. Avg. E/e'=10.3  Cath: 2011  Double vessel CAD involving the RCA and LAD--cypher stent to RCA  Holter: none  EKG  7/2/2021: personally reviewed and interpreted by me: sinus bradycardia, inferior infarct, nonspecific st-t wave changes  Stress:12/7/20   Small sized inferior fixed defect of moderate intensity consistent with    infarction in the territory of the distal RCA . Normal LV size and systolic function. moderate Risk  moderate Complexity/Medical Decision Making  Outside records Reviewed  Labs Reviewed  Prior Imaging, ekg, cath, echo reviewed when available  Medications reviewed  Old Notes reviewed  ASSESSMENT AND PLAN   Knee pain  -     new problem  Plan  -    plans on knee replacement  -    will do a myoview prior clearance    2.    Preop exam  -     scheduled for knee replacement   - new problem  Plan-  -    Patient unable to complete 4 mets, recommend stress test per acc/aha 2014 perioperative guidelines. If no reversible ischemia, low risk for moderate risk surgery. -     recommend he remain on asa perioperatively and betablocker. 3    CAD  -     2007 POLLY RCA  -     stable  Plan  -     continue  Asa, plavix, crestor. Previously discussed prolonged DAPT with Dr. Yasmeen Orr and risk of fatal hemorrhage and death  -     avoidance of all tobacco products  -     discussed importance of not missing a dose of asa, recommend stopping clopidogrel long term    4. Diabetes  -     hgA1c 6.7  -     stable  Plan  -     follow up with pcp for tx, would benefit from sglt2    5. Nocturnal hypoxia  -     previously unwilling to do a sleep study  -     has not had sleep study yet  Plan  -     sleeps with oxygen 2 lpm per NC    6. Essential Hypertension  -     124/62  -    10/26/22    k 3.9  bun 21 creat 1.1  -     stable  plan  -     continue hyzaar 100/25 mg lopressor 25 mg bid    7. Hypertensive kidney disease  -     stage 3a CKD  -     renal cyst, anemia  -     10/26/22  k 3.9 bun 21 creat 1.1  -     stable  fredrick  -     follow with Dr Js Espinal    8. Anemia  -    ckd  -    8/4/2021  hgb 11.2 hct 34.8  -    no visible blood loss  -    dark stools- resolved, no longer on iron pills, has not had colonoscopy  -    new problem  Plan  -    due for colonoscopy- referred to Dr Lubna Tapia for evaluation of anemia. Can hold plavix if needed for colonscopy    9. Mixed Hyperlipidemia  -     2/2/21  Tc 178  Tri 198  ldl 101 hdl 43 ( on crestor 20 and tricor)  -     intolerant to lipitor due to muscle cramps in legs  -     stable  Plan  -     continue crestor 20 mg and tricor, add zetia/pcsk9 if still high  -     will repeat with pcp ( will see today)    10. Carotid disease  -     7/14/2020  Plan  -     followed by Dr Phong Rasmussen      11.    Tobacco use   -     smokes marijuana a couple times a day  -     discussed importance of cessation  -     exposed to second hand smoke--his wife smokes  Plan  -     discussed importance of remaining tobacco free    12. AS  -     6/2021  Mild AS  Plan  -     will continue to monitor, repeat echo in 3 years    15. Claudication  -     VINITA noncompressible, but normal waveforms  Plan  -     consider vascular eval if worsening  -     avoidance of tobacco    14. Overweight  Plan  --    patient would like to lose 20 pounds in next year    Patient counseled on lifestyle modification, diet, and exercise. Follow Up:       Dr. Shanda Rico:  I, Nel Zimmerman, am scribing for and in the presence of Shayy Gallagher MD.   Rosa Maria Reyes 10/31/22 9:45 AM   Physician Attestation  The scribe for and in the presence of fabrice Buckner DO). The scribe Bear Hassan may have prepopulated components of this note with my historical  intellectual property under my direct supervision. Any additions to this intellectual property were performed in my presence and at my direction. Furthermore, the content and accuracy of this note have been reviewed by fabrice Buckner DO).   10/31/2022 9:45 AM

## 2022-10-31 ENCOUNTER — OFFICE VISIT (OUTPATIENT)
Dept: CARDIOLOGY CLINIC | Age: 72
End: 2022-10-31
Payer: MEDICARE

## 2022-10-31 VITALS
OXYGEN SATURATION: 95 % | SYSTOLIC BLOOD PRESSURE: 124 MMHG | DIASTOLIC BLOOD PRESSURE: 62 MMHG | HEART RATE: 50 BPM | BODY MASS INDEX: 32.93 KG/M2 | HEIGHT: 66 IN | WEIGHT: 204.9 LBS

## 2022-10-31 DIAGNOSIS — I65.23 OBSTRUCTION OF CAROTID ARTERY, BILATERAL: ICD-10-CM

## 2022-10-31 DIAGNOSIS — E78.2 MIXED HYPERLIPIDEMIA: ICD-10-CM

## 2022-10-31 DIAGNOSIS — E13.69 OTHER SPECIFIED DIABETES MELLITUS WITH OTHER SPECIFIED COMPLICATION, UNSPECIFIED WHETHER LONG TERM INSULIN USE (HCC): ICD-10-CM

## 2022-10-31 DIAGNOSIS — I25.10 ATHEROSCLEROSIS OF NATIVE CORONARY ARTERY OF NATIVE HEART WITHOUT ANGINA PECTORIS: Primary | ICD-10-CM

## 2022-10-31 DIAGNOSIS — D64.9 ANEMIA, UNSPECIFIED TYPE: ICD-10-CM

## 2022-10-31 DIAGNOSIS — Z01.810 PREOP CARDIOVASCULAR EXAM: ICD-10-CM

## 2022-10-31 DIAGNOSIS — M25.569 KNEE PAIN, UNSPECIFIED CHRONICITY, UNSPECIFIED LATERALITY: ICD-10-CM

## 2022-10-31 DIAGNOSIS — I65.23 OCCLUSION AND STENOSIS OF BILATERAL CAROTID ARTERIES: ICD-10-CM

## 2022-10-31 DIAGNOSIS — I10 ESSENTIAL HYPERTENSION, BENIGN: ICD-10-CM

## 2022-10-31 DIAGNOSIS — N52.9 ERECTILE DYSFUNCTION, UNSPECIFIED ERECTILE DYSFUNCTION TYPE: ICD-10-CM

## 2022-10-31 PROCEDURE — 1123F ACP DISCUSS/DSCN MKR DOCD: CPT | Performed by: INTERNAL MEDICINE

## 2022-10-31 PROCEDURE — G8427 DOCREV CUR MEDS BY ELIG CLIN: HCPCS | Performed by: INTERNAL MEDICINE

## 2022-10-31 PROCEDURE — 3074F SYST BP LT 130 MM HG: CPT | Performed by: INTERNAL MEDICINE

## 2022-10-31 PROCEDURE — 3078F DIAST BP <80 MM HG: CPT | Performed by: INTERNAL MEDICINE

## 2022-10-31 PROCEDURE — G8417 CALC BMI ABV UP PARAM F/U: HCPCS | Performed by: INTERNAL MEDICINE

## 2022-10-31 PROCEDURE — 3046F HEMOGLOBIN A1C LEVEL >9.0%: CPT | Performed by: INTERNAL MEDICINE

## 2022-10-31 PROCEDURE — 3017F COLORECTAL CA SCREEN DOC REV: CPT | Performed by: INTERNAL MEDICINE

## 2022-10-31 PROCEDURE — 4004F PT TOBACCO SCREEN RCVD TLK: CPT | Performed by: INTERNAL MEDICINE

## 2022-10-31 PROCEDURE — 99214 OFFICE O/P EST MOD 30 MIN: CPT | Performed by: INTERNAL MEDICINE

## 2022-10-31 PROCEDURE — G8484 FLU IMMUNIZE NO ADMIN: HCPCS | Performed by: INTERNAL MEDICINE

## 2022-10-31 PROCEDURE — 2022F DILAT RTA XM EVC RTNOPTHY: CPT | Performed by: INTERNAL MEDICINE

## 2022-10-31 RX ORDER — CLOPIDOGREL BISULFATE 75 MG/1
TABLET ORAL
Qty: 90 TABLET | Refills: 3 | Status: SHIPPED | OUTPATIENT
Start: 2022-10-31

## 2022-10-31 RX ORDER — LOSARTAN POTASSIUM AND HYDROCHLOROTHIAZIDE 25; 100 MG/1; MG/1
1 TABLET ORAL DAILY
Qty: 90 TABLET | Refills: 3 | Status: SHIPPED | OUTPATIENT
Start: 2022-10-31

## 2022-10-31 RX ORDER — ROSUVASTATIN CALCIUM 20 MG/1
20 TABLET, COATED ORAL DAILY
Qty: 90 TABLET | Refills: 3 | Status: SHIPPED | OUTPATIENT
Start: 2022-10-31

## 2022-10-31 NOTE — LETTER
[address] [place] [address] Akira [address] [address]  Phone: [phone]  Fax: [phone]    Eusebio Colon DO    November 3, 2022     [address], [address] [id number]    Patient: Brittnee Flores   MR Number: [id number]   YOB: 1950   Date of Visit: 10/31/2022       Dear Ponciano Cockayne:    Thank you for referring Rajesh Askew to me for evaluation/treatment. Below are the relevant portions of my assessment and plan of care. If you have questions, please do not hesitate to call me. I look forward to following Jeanne Parrish along with you.     Sincerely,      Eusebio Colon DO

## 2022-11-07 ENCOUNTER — HOSPITAL ENCOUNTER (OUTPATIENT)
Dept: NON INVASIVE DIAGNOSTICS | Age: 72
Discharge: HOME | End: 2022-11-07
Payer: MEDICARE

## 2022-11-07 DIAGNOSIS — Z01.810 PREOP CARDIOVASCULAR EXAM: ICD-10-CM

## 2022-11-07 DIAGNOSIS — I25.10 ATHEROSCLEROSIS OF NATIVE CORONARY ARTERY OF NATIVE HEART WITHOUT ANGINA PECTORIS: ICD-10-CM

## 2022-11-07 DIAGNOSIS — M25.569 KNEE PAIN, UNSPECIFIED CHRONICITY, UNSPECIFIED LATERALITY: ICD-10-CM

## 2022-11-07 LAB
LV EF: 69 %
LVEF MODALITY: NORMAL

## 2022-11-07 PROCEDURE — A9502 TC99M TETROFOSMIN: HCPCS | Performed by: INTERNAL MEDICINE

## 2022-11-07 PROCEDURE — 6360000002 HC RX W HCPCS: Performed by: INTERNAL MEDICINE

## 2022-11-07 PROCEDURE — 78452 HT MUSCLE IMAGE SPECT MULT: CPT

## 2022-11-07 PROCEDURE — 93017 CV STRESS TEST TRACING ONLY: CPT | Performed by: INTERNAL MEDICINE

## 2022-11-07 PROCEDURE — 3430000000 HC RX DIAGNOSTIC RADIOPHARMACEUTICAL: Performed by: INTERNAL MEDICINE

## 2022-11-07 RX ADMIN — TETROFOSMIN 10 MILLICURIE: 1.38 INJECTION, POWDER, LYOPHILIZED, FOR SOLUTION INTRAVENOUS at 08:00

## 2022-11-07 RX ADMIN — TETROFOSMIN 30 MILLICURIE: 1.38 INJECTION, POWDER, LYOPHILIZED, FOR SOLUTION INTRAVENOUS at 09:26

## 2022-11-07 RX ADMIN — REGADENOSON 0.4 MG: 0.08 INJECTION, SOLUTION INTRAVENOUS at 09:14

## 2022-11-07 NOTE — PROGRESS NOTES
Instructed on Lexiscan Stress Test Procedure including possible side effects/ adverse reactions. Patient verbalizes  understanding and denies having any questions. See [address] [place] [address] Rd Cardiology.   Nicole Alexander RN

## 2022-11-09 NOTE — DISCHARGE INSTRUCTIONS
DO GIOVANNI Thakur Uli is participating in [address] [place] [address] for Joint Replacement (CJR) Hola Le is participating in a Medicare initiative called the [address] [place] [address] for Joint Replacement (CJR) model. Medicare designed this model to encourage higher quality care and greater financial accountability from hospitals when Medicare beneficiaries receive lower-extremity joint replacement procedures (Royann Medicine), typically hip or knee replacements. [address] [place] [address] participation in the [address] [place] [address],[address] [place] [address] should not restrict your access to care for your medical condition or your freedom to choose your health care providers and services. All existing Medicare beneficiary protections continue to be available to you. The CJR model aims to help give you better care. The CJR model aims to support better and more efficient care for beneficiaries undergoing LEJR procedures. A CJR episode of care is typically defined as an admission of an eligible Medicare beneficiary to a hospital participating in the [address] [place] [address] for an Royann Medicine procedure. The LEJR procedure can take place in either an inpatient or outpatient setting and will still be considered a CJR episode of care. The CJR episode of care continues for 90 days following discharge. This model uses episode payment and quality measurement for an episode of care associated with LEJR procedures to encourage hospitals, physicians, and post-acute care providers to work together to improve the quality and coordination of care from the initial hospitalization through recovery. Under the [address] [place] [address],[address] [place] [address], [address] [place] [address] can earn additional payments from Medicare if we meet the high quality goals set by Medicare, while keeping hospital costs and care spending under control. If we dont meet these quality and cost goals, we may have to repay Medicare.      Medicare is using the CJR model to encourage [facility] to work more closely with your doctors and other health care providers that help patients recover after discharge from the hospital including, but not limited to, nursing homes (skilled nursing facilities), home health agencies, inpatient rehabilitation facilities, and long- term care hospitals. If you require a stay in a [facility] (SNF) to assist with your recovery from surgery and if, and only if, it is clinically appropriate, the CJR model permits [facility] to discharge you to a high quality SNF sooner than the three days Medicare usually requires to cover a SNF stay. Medicare will monitor your care to ensure you and others are receiving high quality care. It's your choice which hospital, doctor, or other providers you use. You have the right to choose which hospital, doctor, or other post-hospital stay health care provider you use. If you believe that your care is adversely affected or have concerns about substandard care, you may call 1-800-MEDICARE or contact your states Quality Improvement Organization by going to: Brad.urmila. To find a different doctor, visit [address] Physician Compare website, Crashlytics.CyOptics, or call 1-800-MEDICARE (5-966.808.3319). TTY users should call 4-160.987.4600. To find a different hospital, visit Zarbee'sbe, or  call 1-800-MEDICARE (1- 872.251.4757). TTY users should call  5-424.754.2968. To find a different skilled nursing facility, visit [address] website, [email or web address]/, or call  1-800-MEDICARE (5-730.498.7285). TTY users should call 7-724-456- 0663. To find a different home health agency, visit [address] [place] [address] website, Lettuceco.uk, or call 1-800-MEDICARE (5-465.366.9806). TTY users should call 2-286-453- 1030. For an explanation of how patients can access their health care records and beneficiary claims data, please visit [email or web address]/[email or web address]-button    Get more information     If you have questions or want more information about the Comprehensive Care for Joint Replacement (CJR) model, call [facility] at [phone] or call 1-800-MEDICARE. You can also find additional information at https://innovation.cms.gov/initiatives/cjr.

## 2022-11-10 ENCOUNTER — TELEPHONE (OUTPATIENT)
Dept: CARDIOLOGY CLINIC | Age: 72
End: 2022-11-10

## 2022-11-10 NOTE — TELEPHONE ENCOUNTER
Pt wife called stating he is having surgery on 11/14 and didn't receive cardiac clearance, pt had a doppler done but doctor who was suppose to read it [place] so pt would like to have dkw read results so he can have clearance  for his surgery on 11/14  Pt wife number [phone]  Please advise thank you

## 2022-11-11 NOTE — TELEPHONE ENCOUNTER
Letter is transcribed under letter section. Please fax to appropriate people  Dkw does not read carotid dopplers, this will need to be handled by ordering physician.  Please let patient know

## 2022-11-11 NOTE — PROGRESS NOTES
Spoke to patients wife-she has been unable to obtain vascular clearance-DR Spann is not in office-she states surgery will need to be rescheduled and has left a VM at Dr Chiara Medina office-I also left a VM at office-answered wifes questions-voices understanding

## 2023-01-05 ENCOUNTER — TELEPHONE (OUTPATIENT)
Dept: CARDIOLOGY CLINIC | Age: 73
End: 2023-01-05

## 2023-01-05 NOTE — TELEPHONE ENCOUNTER
CARDIAC CLEARANCE     What type of procedure are you having? Left Total Knee Replacement    [facility]    When is your procedure scheduled for? Feb 20th    Where are you having this procedure? [facility]    Are you taking Blood Thinners? Yes   If so what? (Name/dose/frequesncy)  clopidogrel (PLAVIX) 75 MG tablet  TAKE 1 TABLET BY MOUTH ONCE DAILY  aspirin EC 81 MG EC tablet  Take 81 mg by mouth daily     Does the surgeon want you to stop your blood thinner? If so for how long? Yes 5days prior and advise when to start back up.     Phone Number and Contact Name for Physicians office:  Yvonne May [phone]    Fax number to send information:  [phone]

## 2023-01-05 NOTE — LETTER
[facility] CARDIOLOGY[address]  Dept: [phone]  Dept Fax: [phone]      2023    Patient:Bethel Alva  : 1950  DOS: 2023    To Whom it May Concern:    John Sam has been evaluated for cardiac clearance. Based on diagnostic testing including ***, John Sam is considered at a *** risk for surgery. There is no further cardiac testing that could be done to lower the risk. Please let my office know if you have any questions or concerns.           Елена Brown, [address]

## 2023-01-05 NOTE — LETTER
[facility] CARDIOLOGY[address]  Dept: [phone]  Dept Fax: [phone]      2023    Patient:Bethel Alva  : 1950  DOS: 2023    To Whom it May Concern:    Tino Stratton has been evaluated for cardiac clearance. Based on diagnostic testing including NM Stress Test, Tino Stratton is considered at a low to moderate risk for surgery. There is no further cardiac testing that could be done to lower the risk. Patient should continue aspirin. It is okay to hold Plavix x5 days and resume at your  discretion. He should continue Metoprolol as well. Please let my office know if you have any questions or concerns.           Kingsley Georges [facility] serving [place]

## 2023-01-06 NOTE — TELEPHONE ENCOUNTER
Spoke to JULIETA, Pt is low to moderate risk for surgery. He should continue asa, ok to hold plavix x 5days and resume at the surgeons discretion. He should also continue Metoprolol. Letter typed and is in system. Sent over from chart and also printed and given to Josh Romano to fax to Laura at [facility].

## 2023-01-25 NOTE — PROGRESS NOTES
Dr Doyle office informed that per cardiac clearance letter from DR Becerra-stop Plavix 5 days-do not stop ASA

## 2023-01-25 NOTE — PROGRESS NOTES
Name_______________________________________Printed:____________________  Date and time of surgery_2/20 1330_______________________Arrival Time:__1130______________   1. The instructions given regarding when and if a patient needs to stop oral intake prior to surgery varies. Follow the specific instructions you were given                  _x__Nothing to eat or to drink after Midnight the night before.                   ____Carbo loading or ERAS instructions will be given to select patients-if you have been given those instructions -please do the following                           The evening before your surgery after dinner before midnight drink 40 ounces of gatorade. If you are diabetic use sugar free. The morning of surgery drink 40 ounces of water. This needs to be finished 3 hours prior to your surgery start time. 2. Take the following pills with a small sip of water on the morning of surgery___metoprolol________________________________________________                  Do not take blood pressure medications ending in pril or sartan the yamel prior to surgery or the morning of surgery. Dr Nadeem Gibson patient are not to take any medications the AM of surgery. 3. Aspirin, Ibuprofen, Advil, Naproxen, Vitamin E and other Anti-inflammatory products and supplements should be stopped for 5 -7days before surgery or as directed by your physician. 4. Check with your Doctor regarding stopping Plavix, Coumadin,Eliquis, Lovenox,Effient,Pradaxa,Xarelto, Fragmin or other blood thinners and follow their instructions. to follow DRs instructions on ASA PLAVIX   5. Do not smoke, and do not drink any alcoholic beverages 24 hours prior to surgery. This includes NA Beer. Refrain from the usage of any recreational drugs. 6. You may brush your teeth and gargle the morning of surgery. DO NOT SWALLOW WATER   7.  You MUST make arrangements for a responsible adult to stay on site while you are here and take you home after your surgery. You will not be allowed to leave alone or drive yourself home. It is strongly suggested someone stay with you the first 24 hrs. Your surgery will be cancelled if you do not have a ride home. 8. A parent/legal guardian must accompany a child scheduled for surgery and plan to stay at the hospital until the child is discharged. Please do not bring other children with you. 9. Please wear simple, loose fitting clothing to the hospital.  Annalee Lance not bring valuables (money, credit cards, checkbooks, etc.) Do not wear any makeup (including no eye makeup) or nail polish on your fingers or toes. 10. DO NOT wear any jewelry or piercings on day of surgery. All body piercing jewelry must be removed. 11. If you have ___dentures, they will be removed before going to the OR; we will provide you a container. If you wear ___contact lenses or ___glasses, they will be removed; please bring a case for them. 12. Please see your family doctor/pediatrician for a history & physical and/or concerning medications. Bring any test results/reports from your physician's office. PCP__________________Phone___________H&P Appt. Date________             13 If you  have a Living Will and Durable Power of  for Healthcare, please bring in a copy. 15. Notify your Surgeon if you develop any illness between now and surgery  time, cough, cold, fever, sore throat, nausea, vomiting, etc.  Please notify your surgeon if you experience dizziness, shortness of breath or blurred vision between now & the time of your surgery             15. DO NOT shave your operative site 96 hours prior to surgery. For face & neck surgery, men may use an electric razor 48 hours prior to surgery. 16. Shower the night before or morning of surgery using an antibacterial soap or as you have been instructed.              17. To provide excellent care visitors will be limited to one in the room at any given time.             18.  Please bring picture ID and insurance card. 19.  Visit our web site for additional information:  [email or web address]              20.During flu season no children under the age of 15 are permitted in the hospital for the safety of all patients. 21. If you take a long acting insulin in the evening only  take half of your usual  dose the night  before your procedure              22. If you use a c-pap please bring DOS if staying overnight,             23.For your convenience [facility] has a pharmacy on site to fill your prescriptions. 24. If you use oxygen and have a portable tank please bring it  with you the DOS             25. Bring a complete list of all your medications with name and dose include any supplements. 26. Other___PCP 2/3  PATs by 2/10_______________________________________   *Please call pre admission testing if you any further questions   [facility]         [phone]   Joselito Schneider [address] [place] [address] HEART AND LUNG [place]. [facility]  [phone]   [address] [place] [address]       VISITOR POLICY(subject to change)    Current policy is 2 visitors per patient. No children. Mask is  at the discretion of the facility. Visiting hours are 8a-8p. Overnight visitors will be at the discretion of the nurse. All policies subject to change. All above information reviewed with patient in person or by phone. Patient verbalizes understanding. All questions and concerns addressed.                                                                                                  Patient/Rep__per phone/pt         PRE OP INSTRUCTIONS

## 2023-01-31 DIAGNOSIS — Z01.818 PREOP TESTING: ICD-10-CM

## 2023-01-31 LAB
ABO + RH BLD: NORMAL
ANION GAP SERPL CALCULATED.3IONS-SCNC: 12 MMOL/L (ref 3–16)
ANTIBODY SCREEN: NORMAL
APTT: 29.6 SEC (ref 23–34.3)
BILIRUBIN URINE: NEGATIVE
BLOOD, URINE: NEGATIVE
BUN BLDV-MCNC: 20 MG/DL (ref 7–20)
CALCIUM SERPL-MCNC: 9.7 MG/DL (ref 8.3–10.6)
CHLORIDE BLD-SCNC: 101 MMOL/L (ref 99–110)
CLARITY: CLEAR
CO2: 28 MMOL/L (ref 21–32)
COLOR: YELLOW
CREAT SERPL-MCNC: 1.1 MG/DL (ref 0.8–1.3)
GFR SERPL CREATININE-BSD FRML MDRD: >60 ML/MIN/{1.73_M2}
GLUCOSE BLD-MCNC: 122 MG/DL (ref 70–99)
GLUCOSE URINE: NEGATIVE MG/DL
INR BLD: 1.04 (ref 0.87–1.14)
KETONES, URINE: NEGATIVE MG/DL
LEUKOCYTE ESTERASE, URINE: NEGATIVE
MICROSCOPIC EXAMINATION: NORMAL
NITRITE, URINE: NEGATIVE
PH UA: 6.5 (ref 5–8)
POTASSIUM SERPL-SCNC: 3.4 MMOL/L (ref 3.5–5.1)
PROTEIN UA: NEGATIVE MG/DL
PROTHROMBIN TIME: 13.5 SEC (ref 11.7–14.5)
SODIUM BLD-SCNC: 141 MMOL/L (ref 136–145)
SPECIFIC GRAVITY UA: 1.02 (ref 1–1.03)
URINE TYPE: NORMAL
UROBILINOGEN, URINE: 0.2 E.U./DL

## 2023-02-01 LAB
BASOPHILS ABSOLUTE: 0.1 K/UL (ref 0–0.2)
BASOPHILS RELATIVE PERCENT: 1 %
EOSINOPHILS ABSOLUTE: 0 K/UL (ref 0–0.6)
EOSINOPHILS RELATIVE PERCENT: 0 %
HCT VFR BLD CALC: 38.4 % (ref 40.5–52.5)
HEMOGLOBIN: 12.8 G/DL (ref 13.5–17.5)
LYMPHOCYTES ABSOLUTE: 2.1 K/UL (ref 1–5.1)
LYMPHOCYTES RELATIVE PERCENT: 37 %
MCH RBC QN AUTO: 28.7 PG (ref 26–34)
MCHC RBC AUTO-ENTMCNC: 33.3 G/DL (ref 31–36)
MCV RBC AUTO: 86.3 FL (ref 80–100)
MONOCYTES ABSOLUTE: 0.3 K/UL (ref 0–1.3)
MONOCYTES RELATIVE PERCENT: 5 %
NEUTROPHILS ABSOLUTE: 3.3 K/UL (ref 1.7–7.7)
NEUTROPHILS RELATIVE PERCENT: 57 %
PDW BLD-RTO: 15.3 % (ref 12.4–15.4)
PLATELET # BLD: 207 K/UL (ref 135–450)
PMV BLD AUTO: 8 FL (ref 5–10.5)
RBC # BLD: 4.45 M/UL (ref 4.2–5.9)
RBC # BLD: NORMAL 10*6/UL
SLIDE REVIEW: ABNORMAL
URINE CULTURE, ROUTINE: NORMAL
WBC # BLD: 5.8 K/UL (ref 4–11)

## 2023-02-03 LAB — MRSA CULTURE ONLY: NORMAL

## 2023-02-20 ENCOUNTER — HOSPITAL ENCOUNTER (OUTPATIENT)
Age: 73
Discharge: HOME | End: 2023-02-22
Attending: ORTHOPAEDIC SURGERY | Admitting: ORTHOPAEDIC SURGERY
Payer: MEDICARE

## 2023-02-20 ENCOUNTER — APPOINTMENT (OUTPATIENT)
Dept: GENERAL RADIOLOGY | Age: 73
End: 2023-02-20
Attending: ORTHOPAEDIC SURGERY
Payer: MEDICARE

## 2023-02-20 ENCOUNTER — ANESTHESIA EVENT (OUTPATIENT)
Dept: OPERATING ROOM | Age: 73
End: 2023-02-20
Payer: MEDICARE

## 2023-02-20 ENCOUNTER — ANESTHESIA (OUTPATIENT)
Dept: OPERATING ROOM | Age: 73
End: 2023-02-20
Payer: MEDICARE

## 2023-02-20 DIAGNOSIS — G89.18 POST-OP PAIN: Primary | ICD-10-CM

## 2023-02-20 DIAGNOSIS — M17.12 PRIMARY OSTEOARTHRITIS OF LEFT KNEE: ICD-10-CM

## 2023-02-20 DIAGNOSIS — Z01.818 PREOP TESTING: ICD-10-CM

## 2023-02-20 LAB
ABO/RH: NORMAL
ANTIBODY SCREEN: NORMAL
GLUCOSE BLD-MCNC: 114 MG/DL (ref 70–99)
GLUCOSE BLD-MCNC: 121 MG/DL (ref 70–99)
GLUCOSE BLD-MCNC: 180 MG/DL (ref 70–99)
HCT VFR BLD CALC: 37.7 % (ref 40.5–52.5)
HEMOGLOBIN: 12.2 G/DL (ref 13.5–17.5)
PERFORMED ON: ABNORMAL

## 2023-02-20 PROCEDURE — 3700000000 HC ANESTHESIA ATTENDED CARE: Performed by: ORTHOPAEDIC SURGERY

## 2023-02-20 PROCEDURE — 2580000003 HC RX 258: Performed by: ORTHOPAEDIC SURGERY

## 2023-02-20 PROCEDURE — 3600000015 HC SURGERY LEVEL 5 ADDTL 15MIN: Performed by: ORTHOPAEDIC SURGERY

## 2023-02-20 PROCEDURE — 6360000002 HC RX W HCPCS: Performed by: ORTHOPAEDIC SURGERY

## 2023-02-20 PROCEDURE — 94760 N-INVAS EAR/PLS OXIMETRY 1: CPT

## 2023-02-20 PROCEDURE — 2500000003 HC RX 250 WO HCPCS: Performed by: NURSE ANESTHETIST, CERTIFIED REGISTERED

## 2023-02-20 PROCEDURE — 97530 THERAPEUTIC ACTIVITIES: CPT

## 2023-02-20 PROCEDURE — 97116 GAIT TRAINING THERAPY: CPT

## 2023-02-20 PROCEDURE — 2709999900 HC NON-CHARGEABLE SUPPLY: Performed by: ORTHOPAEDIC SURGERY

## 2023-02-20 PROCEDURE — 6360000002 HC RX W HCPCS: Performed by: ANESTHESIOLOGY

## 2023-02-20 PROCEDURE — 86901 BLOOD TYPING SEROLOGIC RH(D): CPT

## 2023-02-20 PROCEDURE — 85018 HEMOGLOBIN: CPT

## 2023-02-20 PROCEDURE — 7100000001 HC PACU RECOVERY - ADDTL 15 MIN: Performed by: ORTHOPAEDIC SURGERY

## 2023-02-20 PROCEDURE — 97535 SELF CARE MNGMENT TRAINING: CPT

## 2023-02-20 PROCEDURE — 73560 X-RAY EXAM OF KNEE 1 OR 2: CPT

## 2023-02-20 PROCEDURE — 64447 NJX AA&/STRD FEMORAL NRV IMG: CPT | Performed by: ANESTHESIOLOGY

## 2023-02-20 PROCEDURE — 3600000005 HC SURGERY LEVEL 5 BASE: Performed by: ORTHOPAEDIC SURGERY

## 2023-02-20 PROCEDURE — 3700000001 HC ADD 15 MINUTES (ANESTHESIA): Performed by: ORTHOPAEDIC SURGERY

## 2023-02-20 PROCEDURE — 6370000000 HC RX 637 (ALT 250 FOR IP): Performed by: ORTHOPAEDIC SURGERY

## 2023-02-20 PROCEDURE — 97165 OT EVAL LOW COMPLEX 30 MIN: CPT

## 2023-02-20 PROCEDURE — 86900 BLOOD TYPING SEROLOGIC ABO: CPT

## 2023-02-20 PROCEDURE — 7100000000 HC PACU RECOVERY - FIRST 15 MIN: Performed by: ORTHOPAEDIC SURGERY

## 2023-02-20 PROCEDURE — 97161 PT EVAL LOW COMPLEX 20 MIN: CPT

## 2023-02-20 PROCEDURE — 86850 RBC ANTIBODY SCREEN: CPT

## 2023-02-20 PROCEDURE — C1776 JOINT DEVICE (IMPLANTABLE): HCPCS | Performed by: ORTHOPAEDIC SURGERY

## 2023-02-20 PROCEDURE — 2580000003 HC RX 258: Performed by: NURSE ANESTHETIST, CERTIFIED REGISTERED

## 2023-02-20 PROCEDURE — 2720000010 HC SURG SUPPLY STERILE: Performed by: ORTHOPAEDIC SURGERY

## 2023-02-20 PROCEDURE — 2500000003 HC RX 250 WO HCPCS: Performed by: ANESTHESIOLOGY

## 2023-02-20 PROCEDURE — 2500000003 HC RX 250 WO HCPCS: Performed by: ORTHOPAEDIC SURGERY

## 2023-02-20 PROCEDURE — 6360000002 HC RX W HCPCS: Performed by: NURSE ANESTHETIST, CERTIFIED REGISTERED

## 2023-02-20 PROCEDURE — 94150 VITAL CAPACITY TEST: CPT

## 2023-02-20 PROCEDURE — 85014 HEMATOCRIT: CPT

## 2023-02-20 PROCEDURE — C1713 ANCHOR/SCREW BN/BN,TIS/BN: HCPCS | Performed by: ORTHOPAEDIC SURGERY

## 2023-02-20 DEVICE — COMPONENT PAT DIA35MM KNEE POLY CEM MEDIALIZED ANAT ATTUNE: Type: IMPLANTABLE DEVICE | Site: KNEE | Status: FUNCTIONAL

## 2023-02-20 DEVICE — ATTUNE KNEE SYSTEM TIBIAL INSERT ROTATING PLATFORM POSTERIOR STABILIZED 5 15MM AOX
Type: IMPLANTABLE DEVICE | Site: KNEE | Status: FUNCTIONAL
Brand: ATTUNE

## 2023-02-20 DEVICE — SMARTSET GHV GENTAMICIN HIGH VISCOSITY BONE CEMENT 40G
Type: IMPLANTABLE DEVICE | Site: KNEE | Status: FUNCTIONAL
Brand: SMARTSET

## 2023-02-20 DEVICE — BASEPLATE TIB SZ 5 KNEE ROT PLATFRM CEM SYS ATTUNE: Type: IMPLANTABLE DEVICE | Site: KNEE | Status: FUNCTIONAL

## 2023-02-20 DEVICE — ATTUNE KNEE SYSTEM FEMORAL POSTERIOR STABILIZED NARROW SIZE 5N LEFT CEMENTED
Type: IMPLANTABLE DEVICE | Site: KNEE | Status: FUNCTIONAL
Brand: ATTUNE

## 2023-02-20 RX ORDER — ACETAMINOPHEN 325 MG/1
650 TABLET ORAL EVERY 6 HOURS
Status: DISCONTINUED | OUTPATIENT
Start: 2023-02-20 | End: 2023-02-22 | Stop reason: HOSPADM

## 2023-02-20 RX ORDER — LIDOCAINE HYDROCHLORIDE 10 MG/ML
0.5 INJECTION, SOLUTION EPIDURAL; INFILTRATION; INTRACAUDAL; PERINEURAL ONCE
Status: DISCONTINUED | OUTPATIENT
Start: 2023-02-20 | End: 2023-02-20 | Stop reason: HOSPADM

## 2023-02-20 RX ORDER — OXYCODONE HYDROCHLORIDE 5 MG/1
5 TABLET ORAL EVERY 4 HOURS PRN
Status: DISCONTINUED | OUTPATIENT
Start: 2023-02-20 | End: 2023-02-22 | Stop reason: HOSPADM

## 2023-02-20 RX ORDER — HYDROMORPHONE HYDROCHLORIDE 1 MG/ML
0.5 INJECTION, SOLUTION INTRAMUSCULAR; INTRAVENOUS; SUBCUTANEOUS
Status: DISCONTINUED | OUTPATIENT
Start: 2023-02-20 | End: 2023-02-22 | Stop reason: HOSPADM

## 2023-02-20 RX ORDER — HYDROMORPHONE HYDROCHLORIDE 1 MG/ML
0.25 INJECTION, SOLUTION INTRAMUSCULAR; INTRAVENOUS; SUBCUTANEOUS
Status: DISCONTINUED | OUTPATIENT
Start: 2023-02-20 | End: 2023-02-22 | Stop reason: HOSPADM

## 2023-02-20 RX ORDER — OXYCODONE HYDROCHLORIDE 5 MG/1
10 TABLET ORAL EVERY 4 HOURS PRN
Status: DISCONTINUED | OUTPATIENT
Start: 2023-02-20 | End: 2023-02-22 | Stop reason: HOSPADM

## 2023-02-20 RX ORDER — ROPIVACAINE HYDROCHLORIDE 5 MG/ML
INJECTION, SOLUTION EPIDURAL; INFILTRATION; PERINEURAL
Status: COMPLETED | OUTPATIENT
Start: 2023-02-20 | End: 2023-02-20

## 2023-02-20 RX ORDER — SODIUM CHLORIDE 9 MG/ML
INJECTION, SOLUTION INTRAVENOUS PRN
Status: DISCONTINUED | OUTPATIENT
Start: 2023-02-20 | End: 2023-02-22

## 2023-02-20 RX ORDER — SODIUM CHLORIDE 0.9 % (FLUSH) 0.9 %
5-40 SYRINGE (ML) INJECTION EVERY 12 HOURS SCHEDULED
Status: DISCONTINUED | OUTPATIENT
Start: 2023-02-20 | End: 2023-02-22 | Stop reason: HOSPADM

## 2023-02-20 RX ORDER — DEXAMETHASONE SODIUM PHOSPHATE 4 MG/ML
10 INJECTION, SOLUTION INTRA-ARTICULAR; INTRALESIONAL; INTRAMUSCULAR; INTRAVENOUS; SOFT TISSUE ONCE
Status: COMPLETED | OUTPATIENT
Start: 2023-02-21 | End: 2023-02-21

## 2023-02-20 RX ORDER — BUPIVACAINE HYDROCHLORIDE 7.5 MG/ML
INJECTION, SOLUTION INTRASPINAL
Status: COMPLETED | OUTPATIENT
Start: 2023-02-20 | End: 2023-02-20

## 2023-02-20 RX ORDER — FENTANYL CITRATE 50 UG/ML
100 INJECTION, SOLUTION INTRAMUSCULAR; INTRAVENOUS ONCE
Status: COMPLETED | OUTPATIENT
Start: 2023-02-20 | End: 2023-02-20

## 2023-02-20 RX ORDER — ONDANSETRON 4 MG/1
4 TABLET, ORALLY DISINTEGRATING ORAL EVERY 8 HOURS PRN
Status: DISCONTINUED | OUTPATIENT
Start: 2023-02-20 | End: 2023-02-22 | Stop reason: HOSPADM

## 2023-02-20 RX ORDER — MIDAZOLAM HYDROCHLORIDE 2 MG/2ML
2 INJECTION, SOLUTION INTRAMUSCULAR; INTRAVENOUS ONCE
Status: COMPLETED | OUTPATIENT
Start: 2023-02-20 | End: 2023-02-20

## 2023-02-20 RX ORDER — SODIUM CHLORIDE 0.9 % (FLUSH) 0.9 %
5-40 SYRINGE (ML) INJECTION EVERY 12 HOURS SCHEDULED
Status: DISCONTINUED | OUTPATIENT
Start: 2023-02-20 | End: 2023-02-22

## 2023-02-20 RX ORDER — SODIUM CHLORIDE 9 MG/ML
INJECTION, SOLUTION INTRAVENOUS PRN
Status: DISCONTINUED | OUTPATIENT
Start: 2023-02-20 | End: 2023-02-22 | Stop reason: HOSPADM

## 2023-02-20 RX ORDER — SODIUM CHLORIDE, SODIUM LACTATE, POTASSIUM CHLORIDE, CALCIUM CHLORIDE 600; 310; 30; 20 MG/100ML; MG/100ML; MG/100ML; MG/100ML
INJECTION, SOLUTION INTRAVENOUS CONTINUOUS
Status: DISCONTINUED | OUTPATIENT
Start: 2023-02-20 | End: 2023-02-20 | Stop reason: HOSPADM

## 2023-02-20 RX ORDER — SODIUM CHLORIDE, SODIUM LACTATE, POTASSIUM CHLORIDE, CALCIUM CHLORIDE 600; 310; 30; 20 MG/100ML; MG/100ML; MG/100ML; MG/100ML
INJECTION, SOLUTION INTRAVENOUS CONTINUOUS PRN
Status: DISCONTINUED | OUTPATIENT
Start: 2023-02-20 | End: 2023-02-20 | Stop reason: SDUPTHER

## 2023-02-20 RX ORDER — ONDANSETRON 2 MG/ML
4 INJECTION INTRAMUSCULAR; INTRAVENOUS
Status: ACTIVE | OUTPATIENT
Start: 2023-02-20 | End: 2023-02-21

## 2023-02-20 RX ORDER — HYDROMORPHONE HCL 110MG/55ML
0.5 PATIENT CONTROLLED ANALGESIA SYRINGE INTRAVENOUS EVERY 5 MIN PRN
Status: DISCONTINUED | OUTPATIENT
Start: 2023-02-20 | End: 2023-02-22

## 2023-02-20 RX ORDER — HYDROCHLOROTHIAZIDE 25 MG/1
25 TABLET ORAL DAILY
Status: DISCONTINUED | OUTPATIENT
Start: 2023-02-20 | End: 2023-02-22 | Stop reason: HOSPADM

## 2023-02-20 RX ORDER — SODIUM CHLORIDE 0.9 % (FLUSH) 0.9 %
5-40 SYRINGE (ML) INJECTION PRN
Status: DISCONTINUED | OUTPATIENT
Start: 2023-02-20 | End: 2023-02-22

## 2023-02-20 RX ORDER — ROSUVASTATIN CALCIUM 20 MG/1
20 TABLET, COATED ORAL DAILY
Status: DISCONTINUED | OUTPATIENT
Start: 2023-02-20 | End: 2023-02-22 | Stop reason: HOSPADM

## 2023-02-20 RX ORDER — MEPERIDINE HYDROCHLORIDE 25 MG/ML
12.5 INJECTION INTRAMUSCULAR; INTRAVENOUS; SUBCUTANEOUS EVERY 5 MIN PRN
Status: DISCONTINUED | OUTPATIENT
Start: 2023-02-20 | End: 2023-02-22

## 2023-02-20 RX ORDER — SODIUM CHLORIDE 0.9 % (FLUSH) 0.9 %
5-40 SYRINGE (ML) INJECTION PRN
Status: DISCONTINUED | OUTPATIENT
Start: 2023-02-20 | End: 2023-02-22 | Stop reason: HOSPADM

## 2023-02-20 RX ORDER — CLOPIDOGREL BISULFATE 75 MG/1
75 TABLET ORAL DAILY
Status: DISCONTINUED | OUTPATIENT
Start: 2023-02-20 | End: 2023-02-22 | Stop reason: HOSPADM

## 2023-02-20 RX ORDER — EPHEDRINE SULFATE 50 MG/ML
INJECTION INTRAVENOUS PRN
Status: DISCONTINUED | OUTPATIENT
Start: 2023-02-20 | End: 2023-02-20 | Stop reason: SDUPTHER

## 2023-02-20 RX ORDER — ONDANSETRON 2 MG/ML
4 INJECTION INTRAMUSCULAR; INTRAVENOUS EVERY 6 HOURS PRN
Status: DISCONTINUED | OUTPATIENT
Start: 2023-02-20 | End: 2023-02-22 | Stop reason: HOSPADM

## 2023-02-20 RX ORDER — LOSARTAN POTASSIUM AND HYDROCHLOROTHIAZIDE 25; 100 MG/1; MG/1
1 TABLET ORAL DAILY
Status: DISCONTINUED | OUTPATIENT
Start: 2023-02-20 | End: 2023-02-20

## 2023-02-20 RX ORDER — PROPOFOL 10 MG/ML
INJECTION, EMULSION INTRAVENOUS PRN
Status: DISCONTINUED | OUTPATIENT
Start: 2023-02-20 | End: 2023-02-20 | Stop reason: SDUPTHER

## 2023-02-20 RX ORDER — LIDOCAINE HYDROCHLORIDE 20 MG/ML
INJECTION, SOLUTION EPIDURAL; INFILTRATION; INTRACAUDAL; PERINEURAL PRN
Status: DISCONTINUED | OUTPATIENT
Start: 2023-02-20 | End: 2023-02-20 | Stop reason: SDUPTHER

## 2023-02-20 RX ORDER — DIPHENHYDRAMINE HYDROCHLORIDE 50 MG/ML
12.5 INJECTION INTRAMUSCULAR; INTRAVENOUS
Status: ACTIVE | OUTPATIENT
Start: 2023-02-20 | End: 2023-02-21

## 2023-02-20 RX ORDER — LOSARTAN POTASSIUM 100 MG/1
100 TABLET ORAL DAILY
Status: DISCONTINUED | OUTPATIENT
Start: 2023-02-20 | End: 2023-02-22 | Stop reason: HOSPADM

## 2023-02-20 RX ORDER — DEXTROSE MONOHYDRATE 100 MG/ML
INJECTION, SOLUTION INTRAVENOUS CONTINUOUS PRN
Status: DISCONTINUED | OUTPATIENT
Start: 2023-02-20 | End: 2023-02-22 | Stop reason: HOSPADM

## 2023-02-20 RX ORDER — HYDROMORPHONE HCL 110MG/55ML
0.25 PATIENT CONTROLLED ANALGESIA SYRINGE INTRAVENOUS EVERY 5 MIN PRN
Status: DISCONTINUED | OUTPATIENT
Start: 2023-02-20 | End: 2023-02-22

## 2023-02-20 RX ORDER — SODIUM CHLORIDE, SODIUM LACTATE, POTASSIUM CHLORIDE, CALCIUM CHLORIDE 600; 310; 30; 20 MG/100ML; MG/100ML; MG/100ML; MG/100ML
INJECTION, SOLUTION INTRAVENOUS CONTINUOUS
Status: DISCONTINUED | OUTPATIENT
Start: 2023-02-20 | End: 2023-02-22 | Stop reason: HOSPADM

## 2023-02-20 RX ADMIN — PROPOFOL 50 MCG/KG/MIN: 10 INJECTION, EMULSION INTRAVENOUS at 10:51

## 2023-02-20 RX ADMIN — CLOPIDOGREL BISULFATE 75 MG: 75 TABLET ORAL at 15:20

## 2023-02-20 RX ADMIN — EPHEDRINE SULFATE 10 MG: 50 INJECTION, SOLUTION INTRAVENOUS at 11:51

## 2023-02-20 RX ADMIN — SODIUM CHLORIDE, POTASSIUM CHLORIDE, SODIUM LACTATE AND CALCIUM CHLORIDE: 600; 310; 30; 20 INJECTION, SOLUTION INTRAVENOUS at 15:41

## 2023-02-20 RX ADMIN — CEFAZOLIN 2000 MG: 2 INJECTION, POWDER, FOR SOLUTION INTRAMUSCULAR; INTRAVENOUS at 20:05

## 2023-02-20 RX ADMIN — EPHEDRINE SULFATE 10 MG: 50 INJECTION, SOLUTION INTRAVENOUS at 10:57

## 2023-02-20 RX ADMIN — HYDROCHLOROTHIAZIDE 25 MG: 25 TABLET ORAL at 15:20

## 2023-02-20 RX ADMIN — MIDAZOLAM 2 MG: 1 INJECTION INTRAMUSCULAR; INTRAVENOUS at 09:26

## 2023-02-20 RX ADMIN — EPHEDRINE SULFATE 10 MG: 50 INJECTION, SOLUTION INTRAVENOUS at 11:40

## 2023-02-20 RX ADMIN — CEFAZOLIN 2000 MG: 2 INJECTION, POWDER, FOR SOLUTION INTRAMUSCULAR; INTRAVENOUS at 15:42

## 2023-02-20 RX ADMIN — TRANEXAMIC ACID 1000 MG: 1 INJECTION, SOLUTION INTRAVENOUS at 10:27

## 2023-02-20 RX ADMIN — FENTANYL CITRATE 50 MCG: 50 INJECTION, SOLUTION INTRAMUSCULAR; INTRAVENOUS at 09:26

## 2023-02-20 RX ADMIN — BUPIVACAINE HYDROCHLORIDE 12 MG: 7.5 INJECTION, SOLUTION INTRASPINAL at 11:05

## 2023-02-20 RX ADMIN — LOSARTAN POTASSIUM 100 MG: 100 TABLET, FILM COATED ORAL at 15:23

## 2023-02-20 RX ADMIN — SODIUM CHLORIDE, POTASSIUM CHLORIDE, SODIUM LACTATE AND CALCIUM CHLORIDE: 600; 310; 30; 20 INJECTION, SOLUTION INTRAVENOUS at 10:31

## 2023-02-20 RX ADMIN — SODIUM CHLORIDE, POTASSIUM CHLORIDE, SODIUM LACTATE AND CALCIUM CHLORIDE: 600; 310; 30; 20 INJECTION, SOLUTION INTRAVENOUS at 09:00

## 2023-02-20 RX ADMIN — ACETAMINOPHEN 650 MG: 325 TABLET ORAL at 15:20

## 2023-02-20 RX ADMIN — TRANEXAMIC ACID 1000 MG: 1 INJECTION, SOLUTION INTRAVENOUS at 11:58

## 2023-02-20 RX ADMIN — PROPOFOL 50 MG: 10 INJECTION, EMULSION INTRAVENOUS at 10:50

## 2023-02-20 RX ADMIN — LIDOCAINE HYDROCHLORIDE 100 MG: 20 INJECTION, SOLUTION EPIDURAL; INFILTRATION; INTRACAUDAL; PERINEURAL at 10:50

## 2023-02-20 RX ADMIN — ACETAMINOPHEN 650 MG: 325 TABLET ORAL at 20:04

## 2023-02-20 RX ADMIN — ROPIVACAINE HYDROCHLORIDE 10 ML: 5 INJECTION, SOLUTION EPIDURAL; INFILTRATION; PERINEURAL at 09:26

## 2023-02-20 RX ADMIN — ROSUVASTATIN 20 MG: 20 TABLET, FILM COATED ORAL at 15:20

## 2023-02-20 RX ADMIN — CEFAZOLIN 2000 MG: 2 INJECTION, POWDER, FOR SOLUTION INTRAMUSCULAR; INTRAVENOUS at 10:46

## 2023-02-20 RX ADMIN — ROPIVACAINE HYDROCHLORIDE 20 ML: 5 INJECTION, SOLUTION EPIDURAL; INFILTRATION; PERINEURAL at 09:30

## 2023-02-20 RX ADMIN — EPHEDRINE SULFATE 10 MG: 50 INJECTION, SOLUTION INTRAVENOUS at 11:15

## 2023-02-20 RX ADMIN — EPHEDRINE SULFATE 10 MG: 50 INJECTION, SOLUTION INTRAVENOUS at 11:57

## 2023-02-20 ASSESSMENT — PAIN DESCRIPTION - LOCATION: LOCATION: KNEE

## 2023-02-20 ASSESSMENT — PAIN SCALES - GENERAL
PAINLEVEL_OUTOF10: 3
PAINLEVEL_OUTOF10: 6
PAINLEVEL_OUTOF10: 0
PAINLEVEL_OUTOF10: 3
PAINLEVEL_OUTOF10: 0

## 2023-02-20 ASSESSMENT — PAIN DESCRIPTION - FREQUENCY: FREQUENCY: INTERMITTENT

## 2023-02-20 ASSESSMENT — LIFESTYLE VARIABLES: SMOKING_STATUS: 1

## 2023-02-20 ASSESSMENT — PAIN - FUNCTIONAL ASSESSMENT: PAIN_FUNCTIONAL_ASSESSMENT: 0-10

## 2023-02-20 ASSESSMENT — PAIN DESCRIPTION - DESCRIPTORS: DESCRIPTORS: ACHING;DISCOMFORT

## 2023-02-20 ASSESSMENT — PAIN DESCRIPTION - PAIN TYPE: TYPE: SURGICAL PAIN

## 2023-02-20 ASSESSMENT — PAIN DESCRIPTION - ORIENTATION: ORIENTATION: LEFT

## 2023-02-20 NOTE — PROGRESS NOTES
Pt arrived from OR to PACU bay 1. Reported received from [address] [place] [address] staff. Pt is not arousable to voice. Surgical incisions dressings in place to left knee. Pt on RA with LMA in place, NSR, and VSS. Will continue to monitor.

## 2023-02-20 NOTE — PROGRESS NOTES
Pt transferred to room 4464 at this time. A&O with no signs of distress. Report given to zuly ZHONG. V/u and denies questions or further needs at this time.

## 2023-02-20 NOTE — ANESTHESIA PROCEDURE NOTES
Spinal Block    Patient location during procedure: OR  End time: 2/20/2023 11:20 AM  Reason for block: primary anesthetic and at surgeon's request  Staffing  Performed: anesthesiologist and resident/CRNA   Anesthesiologist: Danilo Henriquez MD  Resident/CRNA: ABEBE William - CRNA  Spinal Block  Patient position: sitting  Prep: ChloraPrep  Patient monitoring: cardiac monitor, continuous pulse ox, continuous capnometry, frequent blood pressure checks and oxygen  Approach: right paramedian  Location: L4/L5  Provider prep: sterile gloves and mask  Local infiltration: lidocaine  Needle  Needle type: Quincke   Needle gauge: 25 G  Needle length: 4 in  Needle insertion depth: 3.5 cm  Kit: [id number]  Expiration date: 2/29/2024  Assessment  Hemodynamics: stable  Preanesthetic Checklist  Completed: patient identified, IV checked, site marked, risks and benefits discussed, surgical/procedural consents, pre-op evaluation, timeout performed, anesthesia consent given, oxygen available, monitors applied/VS acknowledged, fire risk safety assessment completed and verbalized and blood product R/B/A discussed and consented

## 2023-02-20 NOTE — PROGRESS NOTES
Pt awake and alert. Pt on RA , VSS. Pt denies pain and nausea, tolerating PO. Skin warm , palpable pulses and able to wiggle toes. Pt meets criteria to be discharged from Phase 1, but waiting for a room assignment at this time. Will continue to monitor.

## 2023-02-20 NOTE — PROGRESS NOTES
Terrence Sullivan,I just saw Ms. Phyllis Nails in the office for her umbilical hernia. I am going to repair it robotically in September after her school is finished. Thank you for allowing me to take care of Ms. Suzanna Acostaid with you. Corinne Glow Time out done, 02 on @ 3 l/min per n/c, then versed & fentanyl IV given, then Dr Chris Otoole completed IPAC, & adductor canal nerve block on left, patient tolerated procedures well.

## 2023-02-20 NOTE — PROGRESS NOTES
[address] [facility],[address] Msb Department   Phone: [phone]    Occupational Therapy    [x] Initial Evaluation            [] Daily Treatment Note         [] Discharge Summary      Patient: Nikki Wagner   : 1950   MRN: [id number]   Date of Service:  2023    Admitting Diagnosis:  Primary osteoarthritis of left knee  Current Admission Summary: Patient admitted for elective surgery, s/p L TKA 2023. Past Medical History:  has a past medical history of Arthritis, CAD (coronary artery disease), Cancer ([address] Chronic kidney disease, Hyperlipidemia, Hypertension, and S/P radiation therapy. Past Surgical History:  has a past surgical history that includes Coronary angioplasty with stent and hernia repair. Discharge Recommendations: Nikki Wagner scored a 20/24 on the AM-PAC ADL Inpatient form. Current research shows that an AM-PAC score of 18 or greater is typically associated with a discharge to the patient's home setting. Based on the patient's AM-PAC score, and their current ADL deficits, it is recommended that the patient have 2-3 sessions per week of Occupational Therapy at d/c to increase the patient's independence. At this time, this patient demonstrates the endurance and safety to discharge home with home health care (home vs OP services) and a follow up treatment frequency of 2-3x/wk. Please see assessment section for further patient specific details. HOME HEALTH CARE: LEVEL 1 STANDARD    - Initial home health evaluation to occur within 24-48 hours, in patient home   - Therapy to evaluate with goal of regaining prior level of functioning   - Therapy to evaluate if patient has [address] needs for personal care      If patient discharges prior to next session this note will serve as a discharge summary. Please see below for the latest assessment towards goals.       DME Required For Discharge: DME to be determined at next level of care    Precautions/Restrictions: low fall risk  Weight Bearing Restrictions: weight bearing as tolerated  [] Right Upper Extremity  [] Left Upper Extremity [] Right Lower Extremity  [x] Left Lower Extremity     Required Braces/Orthotics: no braces required   [] Right  [] Left  Positional Restrictions:no positional restrictions    Pre-Admission Information   Lives With: spouse                  Type of Home: house  Home Layout: one level, able to live on main level  Home Access:  1 step to enter without rails   Bathroom Layout: walk in shower  Bathroom Equipment: shower chair  Toilet Height: standard height, elevated height  Home Equipment: rolling walker, single point cane  Transfer Assistance: Independent without use of device  Ambulation Assistance:Independent without use of device  ADL Assistance: independent with all ADL's  IADL Assistance: independent with homemaking tasks  Active :        [x] Yes                 [] No  Hand Dominance: [] Left                 [x] Right  Current Employment: retired. Occupation:   Hobbies: work part time for [employer]: Denies falls. Examination   Vision:   Vision Gross Assessment: Impaired and Vision Corrective Device: wears glasses at all times  Hearing:   left hearing aid, right hearing aid  Perception:   WFL  Observation:   General Observation:  fair  Posture:   Fair  Sensation:   WFL  Proprioception:    WFL  Tone:   Normotonic  Coordination Testing:   WFL    ROM:   (B) UE AROM WFL  Strength:   (B) UE strength grossly WFL    Therapist Clinical Decision Making (Complexity): low complexity  Clinical Presentation: stable      Subjective  General: Patient supine in bed upon arrival, agreeable to therapy eval  Pain: 3/10.   Location: LLE  Pain Interventions: pain medication in place prior to arrival        Activities of Daily Living  Basic Activities of Daily Living  Upper Extremity Dressing: setup assistance  Lower Extremity Dressing: maximum assistance  Dressing Equipment: none  Dressing Comments: SBA for donning/doffing gown, maxA for donning/doffing brief, patient with blood on brief, RN notified  Toileting Comments: incontinence of urine and blood present in brief, RN notified  General Comments: Patient declines any further ADLs. Instrumental Activities of Daily Living  No IADL completed on this date. Functional Mobility  Bed Mobility  Supine to Sit: stand by assistance  Scooting: stand by assistance  Comments:  Transfers  Sit to stand transfer:contact guard assistance  Stand to sit transfer: contact guard assistance  Comments:  Functional Mobility:  Sitting Balance: stand by assistance. Sitting Balance Comment: EOB  Standing Balance: contact guard assistance. Standing Balance Comment: functional transfers, mobility, ADL completion   Functional Mobility: .  contact guard assistance  Functional Mobility Activity: in hallway and 1 step  Functional Mobility Device Use: rolling walker  Functional Mobility Comment: Patient ambulated ~120ft in room and in hallway, patient with minimal verbal cueing needed for walker management, sequencing, and hand placement for increased safety with functional mobility. Patient with increased pain with ambulation 4/10.        Other Therapeutic Interventions    Functional Outcomes  AM-PAC Inpatient Daily Activity Raw Score: 20    Cognition  WFL  Comments: [facility]  Orientation:    alert and oriented x 4  Command Following:   [facility]     Education  Barriers To Learning: hearing  Patient Education: patient educated on goals, OT role and benefits, plan of care, precautions, ADL adaptive strategies, weight-bearing education, energy conservation, family education, disease specific education, transfer training, discharge recommendations  Learning Assessment:  patient verbalizes understanding, would benefit from continued reinforcement    Assessment  Activity Tolerance: Patient tolerated treatment well  Impairments Requiring Therapeutic Intervention: decreased functional mobility, decreased ADL status, decreased safety awareness, decreased endurance, decreased coordination, increased pain  Prognosis: good  Clinical Assessment: Patient presents with the above deficits impacting occupational performance and functioning below baseline, skilled OT services needed to address deficits to facilitate safe return to PLOF  Safety Interventions: patient left in chair, chair alarm in place, call light within reach, gait belt, patient at risk for falls, nurse notified, and family/caregiver present    Plan  Frequency: 7 x/week  Current Treatment Recommendations: balance training, functional mobility training, transfer training, endurance training, patient/caregiver education, ADL/self-care training, IADL training, home management training, pain management, home exercise program, and safety education    Goals  Patient Goals: Patient did not state    Short Term Goals:  Time Frame: discharge   Patient will complete lower body ADL at modified independent   Patient will complete toileting at modified independent   Patient will complete functional transfers at modified independent   Patient will complete functional mobility at modified independent     Therapy Session Time     Individual Group Co-treatment   Time In    1404   Time Out    1443   Minutes    39        Timed Code Treatment Minutes:   24 minutes   Total Treatment Minutes:  39 minutes        Electronically Signed By: CM Mccullough/HAYDEN [id number]

## 2023-02-20 NOTE — PROGRESS NOTES
Pt arrived to 4t from pacu at 1322. A&OX4, /73   Pulse 67   Temp 97.5 °F (36.4 °C)   Resp 14   Ht 5' 6\" (1.676 m)   Wt 205 lb (93 kg)   SpO2 93%   BMI 33.09 kg/m²   Right leg wrapped in ace wrap, +2 pedal pulse, good cap refill, pt able to lift right leg off bed, no c/o pain, oriented him to room and call light.

## 2023-02-20 NOTE — PROGRESS NOTES
[facility] Department   Phone: [phone]    Physical Therapy    [x] Initial Evaluation            [] Daily Treatment Note         [] Discharge Summary      Patient: Raghav Navarrete   : 1950   MRN: [id number]   Date of Service:  2023  Admitting Diagnosis: Primary osteoarthritis of left knee    Current Admission Summary: Patient admitted for elective surgery, s/p L TKA 2023. Past Medical History:  has a past medical history of Arthritis, CAD (coronary artery disease), Cancer ([address] Chronic kidney disease, Hyperlipidemia, Hypertension, and S/P radiation therapy. Past Surgical History:  has a past surgical history that includes Coronary angioplasty with stent and hernia repair. Discharge Recommendations: Raghav Navarrete scored a 20/24 on the AM-PAC short mobility form. Current research shows that an AM-PAC score of 18 or greater is typically associated with a discharge to the patient's home setting. Based on the patient's AM-PAC score and their current functional mobility deficits, it is recommended that the patient have 2-3 sessions per week of Physical Therapy at d/c to increase the patient's independence. At this time, this patient demonstrates the endurance and safety to discharge home with home health PT and a follow up treatment frequency of 2-3x/wk. Please see assessment section for further patient specific details. If patient discharges prior to next session this note will serve as a discharge summary. Please see below for the latest assessment towards goals.        DME Required For Discharge: rolling walker  Precautions/Restrictions: high fall risk  Weight Bearing Restrictions: weight bearing as tolerated  [] Right Upper Extremity  [] Left Upper Extremity [] Right Lower Extremity  [x] Left Lower Extremity     Required Braces/Orthotics: no braces required   [] Right  [] Left  Positional Restrictions:no positional restrictions    Pre-Admission Information   Lives With: spouse    Type of Home: house  Home Layout: one level, able to live on main level  Home Access:  1 step to enter without rails   Bathroom Layout: walk in shower  Bathroom Equipment: shower chair  Toilet Height: standard height, elevated height  Home Equipment: rolling walker, single point cane  Transfer Assistance: Independent without use of device  Ambulation Assistance:Independent without use of device  ADL Assistance: independent with all ADL's  IADL Assistance: independent with homemaking tasks  Active :        [x] Yes  [] No  Hand Dominance: [] Left  [x] Right  Current Employment: retired. Occupation:   Hobbies: work part time for [employer]: Denies falls. Examination   Vision:   Vision Gross Assessment: Impaired and Vision Corrective Device: wears glasses at all times  Hearing:   hard of hearing, left hearing aid, right hearing aid  Observation:   General Observation:  Patient supine in bed, HOB elevated, ACE wrap to left knee. Posture:   WFL  Sensation:   accurately detects gross touch to all extremities and denies numbness and tingling    ROM:   RLE WFL:  L knee flexion 97 degrees, extension 0 degrees  Strength:   RLE WFL; LLE grossly 3/5  Therapist Clinical Decision Making (Complexity): low complexity  Clinical Presentation: stable      Subjective  General: Patient reports minimal knee pain, plans to go home tomorrow, was independent at home without device, used a cane for longer distances outside home. Pain: 3/10.   Location: L knee  Pain Interventions: RN notified and repositioned        Functional Mobility  Bed Mobility  Supine to Sit: stand by assistance  Bridging: stand by assistance  Comments:  HOB elevated   Transfers  Sit to stand transfer: contact guard assistance  Stand to sit transfer: contact guard assistance  Bed to chair transfer: contact guard assistance  Comments:  Verbal cues to perform full turn to sit in chair, hand placement, sequence. Ambulation  Surface:level surface  Assistive Device: rolling walker  Assistance: contact guard assistance  Distance: 130'  Gait Mechanics: dec'd virgil, reciprocal pattern  Comments:  steady, safe, no LOB  Stair Mobility  Number of Steps: 1  Step Height: 6 inch  Hand Rails: (R) ascending handrail  Device: no device  Assistance: contact guard assistance  Comments:  Verbal cues to get closer to step before attempting to negotiate it. Wheelchair Mobility:  No w/c mobility completed on this date. Comments:  Balance  Static Sitting Balance: good: independent with functional balance in unsupported position  Static Standing Balance: fair (-): maintains balance at CGA with use of UE support  Dynamic Standing Balance: fair (-): maintains balance at CGA with use of UE support  Comments:    Other Therapeutic Interventions    Functional Outcomes  AM-PAC Inpatient Mobility Raw Score : 20              Cognition  WFL  Orientation:    alert and oriented x 4  Command Following:   [facility]    Education  Barriers To Learning: hearing  Patient Education: patient educated on goals, PT role and benefits, plan of care, weight-bearing education, general safety, functional mobility training, proper use of assistive device/equipment, family education, transfer training, discharge recommendations  Learning Assessment:  patient verbalizes and demonstrates understanding    Assessment  Activity Tolerance: ROM, strength, and pain causing functional deficits. Impairments Requiring Therapeutic Intervention: decreased functional mobility, decreased ADL status, decreased ROM, decreased strength, decreased endurance, decreased balance, increased pain  Prognosis: good  Clinical Assessment: Patient was independent in home, MOD I with cane outside of home, admitted for elective surgery and now s/p L TKA. He is able to perform bed mobility w/ SBA and transfers and ambulation w/ CGA using RW.   Recommend d/c home post acute stay with initial 24 hour assist and home health PT. Safety Interventions: patient left in chair, chair alarm in place, call light within reach, gait belt, patient at risk for falls, nurse notified, and family/caregiver present    Plan  Frequency: 7 x/week  Current Treatment Recommendations: strengthening, ROM, balance training, functional mobility training, transfer training, gait training, stair training, endurance training, modalities, patient/caregiver education, home exercise program, and equipment evaluation/education    Goals  Patient Goals: Return home tomorrow. Short Term Goals:  Time Frame: Discharge.   Patient will complete bed mobility at modified independent   Patient will complete transfers at [facility]   Patient will ambulate 150 ft with use of rolling walker at modified independent  Patient will ascend/descend 1 stairs without use of HR at [facility]  Patient will complete car transfer at stand by assistance    Therapy Session Time      Individual Group Co-treatment   Time In     1404   Time Out     1443   Minutes     39     Timed Code Treatment Minutes:  24 Minutes  Total Treatment Minutes:  39       Electronically Signed By: Cristal Woods PT, DPT, [id number] [id number]

## 2023-02-20 NOTE — ANESTHESIA PROCEDURE NOTES
Peripheral Block    Patient location during procedure: pre-op  Reason for block: post-op pain management  Start time: 2/20/2023 9:30 AM  End time: 2/20/2023 9:34 AM  Staffing  Performed: anesthesiologist   Anesthesiologist: Danilo Henriquez MD  Preanesthetic Checklist  Completed: patient identified, IV checked, site marked, risks and benefits discussed, surgical/procedural consents, equipment checked, pre-op evaluation, timeout performed, anesthesia consent given, oxygen available, monitors applied/VS acknowledged, fire risk safety assessment completed and verbalized and blood product R/B/A discussed and consented  Peripheral Block   Prep: ChloraPrep  Patient monitoring: continuous pulse ox, continuous capnometry, frequent blood pressure checks, oxygen, IV access and responsive to questions  Block type: iPacks  Laterality: left  Injection technique: single-shot  Guidance: ultrasound guided    Needle   Needle type: insulated echogenic nerve stimulator needle   Needle gauge: 22 G  Needle localization: ultrasound guidance  Test dose: negative  Assessment   Injection assessment: negative aspiration for heme, no paresthesia on injection, local visualized surrounding nerve on ultrasound and low pressure verified by pressure monitor  Slow fractionated injection: yes  Hemodynamics: stable  Real-time US image taken/store: yes  Outcomes: uncomplicated    Medications Administered  ropivacaine (NAROPIN) injection 0.5% - Perineural   20 mL - 2/20/2023 9:30:00 AM

## 2023-02-20 NOTE — ANESTHESIA POSTPROCEDURE EVALUATION
Department of Anesthesiology  Postprocedure Note    Patient: Darrell Rdz  MRN: [id number]  YOB: 1950  Date of evaluation: 2/20/2023      Procedure Summary     Date: 02/20/23 Room / Location: [address]    Anesthesia Start: 1031 Anesthesia Stop:     Procedure: LEFT TOTAL KNEE ARTHROPLASTY - Taj Sinner (Left: Knee) Diagnosis:       Osteoarthritis of left knee, unspecified osteoarthritis type      (Osteoarthritis of left knee, unspecified osteoarthritis type [M17.12])    Surgeons: Boogie Hernandez MD Responsible Provider: Giovanna Preston MD    Anesthesia Type: spinal, regional ASA Status: 3          Anesthesia Type: No value filed.     Radha Phase I: Radha Score: 6    Radha Phase II:        Anesthesia Post Evaluation    Patient location during evaluation: PACU  Patient participation: complete - patient participated  Level of consciousness: awake and alert  Pain score: 2  Airway patency: patent  Nausea & Vomiting: no vomiting  Complications: no  Cardiovascular status: blood pressure returned to baseline  Respiratory status: acceptable  Hydration status: euvolemic  Multimodal analgesia pain management approach

## 2023-02-20 NOTE — ANESTHESIA PRE PROCEDURE
Department of Anesthesiology  Preprocedure Note       Name:  Raghav Navarrete   Age:  67 y.o.  :  1950                                          MRN:  [id number]         Date:  2023      Surgeon: Rosa Ash):  Kem Andrade MD    Procedure: Procedure(s):  LEFT TOTAL KNEE ARTHROPLASTY - DEPUY    Medications prior to admission:   Prior to Admission medications    Medication Sig Start Date End Date Taking? Authorizing Provider   Ferrous Sulfate (IRON) 325 (65 Fe) MG TABS Take 1 tablet by mouth once daily with breakfast 22   Janis STEPHENS PA-C   rosuvastatin (CRESTOR) 20 MG tablet Take 1 tablet by mouth daily 10/31/22   Florencia Low DO   losartan-hydroCHLOROthiazide [facility]) 100-25 MG per tablet Take 1 tablet by mouth daily 10/31/22   Florencia Low DO   clopidogrel (PLAVIX) 75 MG tablet TAKE 1 TABLET BY MOUTH ONCE DAILY 10/31/22   Florencia Low DO   metoprolol tartrate (LOPRESSOR) 25 MG tablet Take 1 tablet by mouth 2 times daily 10/31/22   Florencia Low DO   calcium carbonate 1500 (600 Ca) MG TABS tablet Take 600 mg by mouth daily    Historical Provider, MD   tadalafil (CIALIS) 5 MG tablet TAKE ONE TABLET BY MOUTH DAILY AS NEEDED  FOR ERECTILE DYSFUNCTION 20   Katie Tate MD   Coenzyme Q10 (COQ10) 400 MG CAPS Take 1 capsule by mouth    Historical Provider, MD   aspirin EC 81 MG EC tablet Take 81 mg by mouth daily    Historical Provider, MD       Current medications:    Current Facility-Administered Medications   Medication Dose Route Frequency Provider Last Rate Last Admin    ortho mix injection   Injection On Call Kem Andrade MD        tranexamic acid (CYKLOKAPRON) 1,000 mg in sodium chloride 0.9 % 60 mL IVPB  1,000 mg IntraVENous Once Kem Andrade MD        tranexamic acid (CYKLOKAPRON) 1,000 mg in sodium chloride 0.9 % 60 mL IVPB  1,000 mg IntraVENous On Call to [address] [place], [place] [address]don Burna, MD           Allergies:     Allergies   Allergen Reactions    Statins Leg cramps on pravastatin & crestor       Problem List:    Patient Active Problem List   Diagnosis Code    HLD (hyperlipidemia) E78.5    Essential hypertension, benign I10    Coronary atherosclerosis of native coronary artery I25.10    Tobacco abuse Z72.0    Bilateral carotid artery stenosis I65.23    Allergic rhinitis due to pollen J30.1    Carotid stenosis, asymptomatic, bilateral I65.23    Elevated blood lead level R78.71    Elevated PSA R97.20    Erectile dysfunction N52.9    Hearing difficulty of both ears H91.93    Hypertensive kidney disease with chronic kidney disease stage III ([facility] [address] I12.9, N18.30    Impacted cerumen, bilateral H61.23    Non-restorative sleep G47.8    Snoring R06.83    Type 2 diabetes mellitus with complication, without long-term current use of insulin (HCC) E11.8       Past Medical History:        Diagnosis Date    Arthritis     CAD (coronary artery disease)     Cancer ([facility] 11/2020    prostate in remission  radiation therapy    Chronic kidney disease     Hyperlipidemia     Hypertension     S/P radiation therapy        Past Surgical History:        Procedure Laterality Date    CORONARY ANGIOPLASTY WITH STENT PLACEMENT      x2  approx 20years ago    HERNIA REPAIR         Social History:    Social History     Tobacco Use    Smoking status: Some Days     Types: Pipe    Smokeless tobacco: Never   Substance Use Topics    Alcohol use:  Yes     Alcohol/week: 2.0 standard drinks     Types: 2 Cans of beer per week     Comment: minimal/Beer every now and then up to 2 times a week                                Ready to quit: Not Answered  Counseling given: Not Answered      Vital Signs (Current):   Vitals:    10/21/22 1357 01/25/23 1228   Weight: 200 lb (90.7 kg) 200 lb (90.7 kg)   Height: 5' 6\" (1.676 m) 5' 6\" (1.676 m)                                              BP Readings from Last 3 Encounters:   12/02/22 133/78   10/31/22 124/62   06/03/22 (!) 140/64       NPO Status:                                                                                 BMI:   Wt Readings from Last 3 Encounters:   01/25/23 200 lb (90.7 kg)   12/02/22 209 lb (94.8 kg)   10/31/22 204 lb 14.4 oz (92.9 kg)     Body mass index is 32.28 kg/m². CBC:   Lab Results   Component Value Date/Time    WBC 5.8 01/31/2023 11:38 AM    RBC 4.45 01/31/2023 11:38 AM    HGB 12.8 01/31/2023 11:38 AM    HCT 38.4 01/31/2023 11:38 AM    MCV 86.3 01/31/2023 11:38 AM    RDW 15.3 01/31/2023 11:38 AM     01/31/2023 11:38 AM       CMP:   Lab Results   Component Value Date/Time     01/31/2023 11:38 AM    K 3.4 01/31/2023 11:38 AM     01/31/2023 11:38 AM    CO2 28 01/31/2023 11:38 AM    BUN 20 01/31/2023 11:38 AM    CREATININE 1.1 01/31/2023 11:38 AM    GFRAA >60 05/31/2022 10:03 AM    GFRAA >60 09/21/2012 11:31 AM    AGRATIO 1.6 11/28/2022 11:10 AM    LABGLOM >60 01/31/2023 11:38 AM    GLUCOSE 122 01/31/2023 11:38 AM    GLUCOSE 108 02/11/2022 12:35 PM    PROT 6.6 11/28/2022 11:10 AM    PROT 7.0 09/21/2012 11:31 AM    CALCIUM 9.7 01/31/2023 11:38 AM    BILITOT 0.6 11/28/2022 11:10 AM    ALKPHOS 88 11/28/2022 11:10 AM    AST 24 11/28/2022 11:10 AM    AST 30 02/11/2022 12:35 PM    ALT 21 11/28/2022 11:10 AM       POC Tests: No results for input(s): POCGLU, POCNA, POCK, POCCL, POCBUN, POCHEMO, POCHCT in the last 72 hours.     Coags:   Lab Results   Component Value Date/Time    PROTIME 13.5 01/31/2023 11:38 AM    INR 1.04 01/31/2023 11:38 AM    APTT 29.6 01/31/2023 11:38 AM       HCG (If Applicable): No results found for: PREGTESTUR, PREGSERUM, HCG, HCGQUANT     ABGs: No results found for: PHART, PO2ART, DNW7JJQ, OPV6AKG, BEART, O4DUBQIV     Type & Screen (If Applicable):  No results found for: LABABO, LABRH    Drug/Infectious Status (If Applicable):  No results found for: HIV, HEPCAB    COVID-19 Screening (If Applicable): No results found for: COVID19        Anesthesia Evaluation  Patient summary reviewed and Nursing notes reviewed  Airway: Mallampati: II  TM distance: >3 FB   Neck ROM: full  Mouth opening: > = 3 FB   Dental:          Pulmonary:   (+) current smoker                           Cardiovascular:  Exercise tolerance: poor (<4 METS),   (+) hypertension: moderate, CAD: non-obstructive,                   Neuro/Psych:               GI/Hepatic/Renal:             Endo/Other:    (+) DiabetesType II DM, well controlled, , .                  ROS comment: S/P RADIATION Abdominal:             Vascular: Other Findings:           Anesthesia Plan      spinal and regional     ASA 3       Induction: intravenous. MIPS: Postoperative opioids intended, Prophylactic antiemetics administered and Postoperative trial extubation. Anesthetic plan and risks discussed with patient. Plan discussed with CRNA.           Post-op pain plan if not by surgeon: single peripheral nerve block            Jaylen Chester MD   2/20/2023

## 2023-02-20 NOTE — H&P
Date of Surgery Update:  Danielle Castano was seen, history and physical examination reviewed, and patient examined by me today. There have been no significant clinical changes since the completion of the previous history and physical.    The risk, benefits, and alternatives of the proposed procedure have been explained to the patient (or appropriate guardian) and understanding verbalized. All questions answered. Patient wishes to proceed.     Electronically signed by: Rishi Holder MD,2/20/2023,10:30 AM

## 2023-02-20 NOTE — PROGRESS NOTES
Pt stable and able to be transferred from PACU to room 4464. A&O , VSS, with no complaints at this time. 4T called and notified that pt is being transferred out of PACU and to room.

## 2023-02-20 NOTE — PROGRESS NOTES
Incentive Spirometry education and demonstration completed by Respiratory Therapy Yes      Response to education: Very Good     Teaching Time: 5 minutes    Minimum Predicted Vital Capacity - 618 mL. Patient's Actual Vital Capacity - 1000 mL. Turning over to Nursing for routine follow-up Yes.     Comments:     Electronically signed by Sue Storey RCP on 2/20/2023 at 6:21 PM

## 2023-02-21 PROBLEM — G47.33 OBSTRUCTIVE SLEEP APNEA: Status: ACTIVE | Noted: 2023-02-21

## 2023-02-21 PROBLEM — G47.34 NOCTURNAL HYPOXEMIA: Status: ACTIVE | Noted: 2023-02-21

## 2023-02-21 LAB
GLUCOSE BLD-MCNC: 126 MG/DL (ref 70–99)
GLUCOSE BLD-MCNC: 167 MG/DL (ref 70–99)
GLUCOSE BLD-MCNC: 175 MG/DL (ref 70–99)
GLUCOSE BLD-MCNC: 272 MG/DL (ref 70–99)
PERFORMED ON: ABNORMAL

## 2023-02-21 PROCEDURE — 6370000000 HC RX 637 (ALT 250 FOR IP): Performed by: INTERNAL MEDICINE

## 2023-02-21 PROCEDURE — 97535 SELF CARE MNGMENT TRAINING: CPT

## 2023-02-21 PROCEDURE — 97110 THERAPEUTIC EXERCISES: CPT

## 2023-02-21 PROCEDURE — 97530 THERAPEUTIC ACTIVITIES: CPT

## 2023-02-21 PROCEDURE — 97116 GAIT TRAINING THERAPY: CPT

## 2023-02-21 PROCEDURE — 6360000002 HC RX W HCPCS: Performed by: ORTHOPAEDIC SURGERY

## 2023-02-21 PROCEDURE — 6370000000 HC RX 637 (ALT 250 FOR IP): Performed by: ORTHOPAEDIC SURGERY

## 2023-02-21 PROCEDURE — 2580000003 HC RX 258: Performed by: ORTHOPAEDIC SURGERY

## 2023-02-21 RX ORDER — DIAZEPAM 5 MG/1
5 TABLET ORAL EVERY 8 HOURS PRN
Qty: 21 TABLET | Refills: 0 | Status: SHIPPED | OUTPATIENT
Start: 2023-02-21 | End: 2023-02-28

## 2023-02-21 RX ORDER — OXYCODONE HYDROCHLORIDE 5 MG/1
5-10 TABLET ORAL EVERY 6 HOURS PRN
Qty: 50 TABLET | Refills: 0 | Status: SHIPPED | OUTPATIENT
Start: 2023-02-21 | End: 2023-02-28

## 2023-02-21 RX ORDER — METOPROLOL TARTRATE 50 MG/1
50 TABLET, FILM COATED ORAL 2 TIMES DAILY
Status: DISCONTINUED | OUTPATIENT
Start: 2023-02-21 | End: 2023-02-22 | Stop reason: HOSPADM

## 2023-02-21 RX ORDER — ACETAMINOPHEN 500 MG
500-1000 TABLET ORAL 4 TIMES DAILY PRN
Qty: 120 TABLET | Refills: 0 | Status: SHIPPED | OUTPATIENT
Start: 2023-02-21

## 2023-02-21 RX ORDER — METOPROLOL TARTRATE 50 MG/1
50 TABLET, FILM COATED ORAL DAILY
Status: DISCONTINUED | OUTPATIENT
Start: 2023-02-22 | End: 2023-02-21

## 2023-02-21 RX ADMIN — ROSUVASTATIN 20 MG: 20 TABLET, FILM COATED ORAL at 09:02

## 2023-02-21 RX ADMIN — ACETAMINOPHEN 650 MG: 325 TABLET ORAL at 09:02

## 2023-02-21 RX ADMIN — HYDROCHLOROTHIAZIDE 25 MG: 25 TABLET ORAL at 09:02

## 2023-02-21 RX ADMIN — CLOPIDOGREL BISULFATE 75 MG: 75 TABLET ORAL at 09:02

## 2023-02-21 RX ADMIN — ACETAMINOPHEN 650 MG: 325 TABLET ORAL at 14:09

## 2023-02-21 RX ADMIN — ACETAMINOPHEN 650 MG: 325 TABLET ORAL at 00:48

## 2023-02-21 RX ADMIN — LOSARTAN POTASSIUM 100 MG: 100 TABLET, FILM COATED ORAL at 09:02

## 2023-02-21 RX ADMIN — ACETAMINOPHEN 650 MG: 325 TABLET ORAL at 21:22

## 2023-02-21 RX ADMIN — OXYCODONE HYDROCHLORIDE 5 MG: 5 TABLET ORAL at 05:10

## 2023-02-21 RX ADMIN — DEXAMETHASONE SODIUM PHOSPHATE 10 MG: 4 INJECTION, SOLUTION INTRAMUSCULAR; INTRAVENOUS at 05:07

## 2023-02-21 RX ADMIN — METOPROLOL TARTRATE 50 MG: 50 TABLET ORAL at 21:22

## 2023-02-21 RX ADMIN — METOPROLOL TARTRATE 25 MG: 25 TABLET, FILM COATED ORAL at 09:02

## 2023-02-21 RX ADMIN — SODIUM CHLORIDE, POTASSIUM CHLORIDE, SODIUM LACTATE AND CALCIUM CHLORIDE: 600; 310; 30; 20 INJECTION, SOLUTION INTRAVENOUS at 00:49

## 2023-02-21 ASSESSMENT — PAIN DESCRIPTION - PAIN TYPE: TYPE: SURGICAL PAIN

## 2023-02-21 ASSESSMENT — PAIN SCALES - GENERAL
PAINLEVEL_OUTOF10: 7
PAINLEVEL_OUTOF10: 5
PAINLEVEL_OUTOF10: 2
PAINLEVEL_OUTOF10: 4

## 2023-02-21 ASSESSMENT — PAIN DESCRIPTION - ORIENTATION: ORIENTATION: LEFT

## 2023-02-21 ASSESSMENT — PAIN DESCRIPTION - DESCRIPTORS: DESCRIPTORS: ACHING;DISCOMFORT

## 2023-02-21 ASSESSMENT — PAIN DESCRIPTION - LOCATION: LOCATION: KNEE

## 2023-02-21 NOTE — CARE COORDINATION
[facility] received a referral to this patient for a rolling walker. Rolling walker has been delivered to the patient's room. Thank you for the referral.  Electronically signed by Aden Olmstead on 2/21/2023 at 9:52 AM  Cell ph# [phone]    NOTE: After 5:00 pm, Weekends, Holidays: Call [facility] at [phone] to coordinate delivery of home medical equipment.

## 2023-02-21 NOTE — CARE COORDINATION
Discharge Planning Note:    Chart reviewed and it appears that patient has minimal needs for discharge at this time. Discussed with patient and requested that case management be notified if discharge needs are identified.     - Current discharge plan is for the patient to return home. - PCP: Nicole Libman, MD    - Patient needs a walker - [employer] will be providing the walker.    - Patient states he has [address] in place. Case management will continue to follow progress and update discharge plan as needed.       Risk of Readmission Score: 7%    MER BabinN RN    [facility]  Phone: [phone]

## 2023-02-21 NOTE — DISCHARGE INSTR - COC
Continuity of Care Form    Patient Name: Ynuiel Duque   :  1950  MRN:  [id number]    Admit date:  2023  Discharge date:  ***    Code Status Order: Full Code   Advance Directives:   Advance Care Flowsheet Documentation       Date/Time Healthcare Directive Type of Healthcare Directive Copy in [address] Agent's Name Healthcare Agent's Phone Number    23 0845 No, patient does not have an advance directive for healthcare treatment -- -- -- -- --            Admitting Physician:  Shakira Fofana MD  PCP: Ayala Givens MD    Discharging Nurse: [facility] Unit/Room#: [id number]  Discharging Unit Phone Number: ***    Emergency Contact:   Extended Emergency Contact Information  Primary Emergency Contact: Chel Margarito [address] Phone: [phone]  Relation: Spouse    Past Surgical History:  Past Surgical History:   Procedure Laterality Date    CORONARY ANGIOPLASTY WITH STENT PLACEMENT      x2  approx 20years ago    HERNIA REPAIR         Immunization History:   Immunization History   Administered Date(s) Administered    COVID-19, MODERNA BLUE border, Primary or Immunocompromised, (age 12y+), IM, 100 mcg/0.5mL 2021       Active Problems:  Patient Active Problem List   Diagnosis Code    HLD (hyperlipidemia) E78.5    Essential hypertension, benign I10    Coronary atherosclerosis of native coronary artery I25.10    Tobacco abuse Z72.0    Bilateral carotid artery stenosis I65.23    Allergic rhinitis due to pollen J30.1    Carotid stenosis, asymptomatic, bilateral I65.23    Elevated blood lead level R78.71    Elevated PSA R97.20    Erectile dysfunction N52.9    Hearing difficulty of both ears H91.93    Hypertensive kidney disease with chronic kidney disease stage III (HCC) I12.9, N18.30    Impacted cerumen, bilateral H61.23    Non-restorative sleep G47.8    Snoring R06.83    Type 2 diabetes mellitus with complication, without long-term current use of insulin (HCC) E11.8    Primary osteoarthritis of left knee M17.12       Isolation/Infection:   Isolation            No Isolation          Patient Infection Status       None to display            Nurse Assessment:  Last Vital Signs: BP (!) 147/63   Pulse 76   Temp 97.9 °F (36.6 °C) (Oral)   Resp 16   Ht 5' 6\" (1.676 m)   Wt 205 lb (93 kg)   SpO2 96%   BMI 33.09 kg/m²     Last documented pain score (0-10 scale): Pain Level: 5  Last Weight:   Wt Readings from Last 1 Encounters:   02/20/23 205 lb (93 kg)     Mental Status:  {IP PT MENTAL STATUS:20030}    IV Access:  { YOLANDA IV ACCESS:435833761}    Nursing Mobility/ADLs:  Walking   {P DME SSAL:183524855}  Transfer  {P DME TMOA:497845412}  Bathing  {P DME PGAB:174146984}  Dressing  {P DME PCXB:412932334}  Toileting  {CHP DME QGGF:245468568}  Feeding  {P DME TIHV:952149917}  Med Admin  {[facility] DME IKCK:761159258}  Med Delivery   { YOLANDA MED Delivery:975566907}    Wound Care Documentation and Therapy:  Incision 02/20/23 Knee Anterior; Left (Active)   Dressing Status Clean;Dry; Intact 02/21/23 0400   Incision Assessment Other (Comment) 02/21/23 0400   Drainage Amount None 02/21/23 0400   Odor None 02/20/23 1245   Number of days: 1        Elimination:  Continence: Bowel: {YES / TH:14788}  Bladder: {YES / GD:10617}  Urinary Catheter: {Urinary Catheter:468459465}   Colostomy/Ileostomy/Ileal Conduit: {YES / RM:19226}       Date of Last BM: ***    Intake/Output Summary (Last 24 hours) at 2/21/2023 0807  Last data filed at 2/21/2023 2824  Gross per 24 hour   Intake 1380 ml   Output 550 ml   Net 830 ml     I/O last 3 completed shifts:   In: 1380 [P.O.:480; I.V.:900]  Out: 550 [Urine:550]    Safety Concerns:     [address] YOLANDA Safety Concerns:831652421}    Impairments/Disabilities:      [address] GUERRERO Impairments/Disabilities:653350656}    Nutrition Therapy:  Current Nutrition Therapy:   [address] GU[address] Diet List:331474466}    Routes of Feeding: {[facility] DME Other Feedings:607672910}  Liquids: {Slp liquid thickness:89365}  Daily Fluid Restriction: {CHP DME Yes amt example:734376746}  Last Modified Barium Swallow with Video (Video Swallowing Test): {Done Not Done OAOH:726572796}    Treatments at the Time of Hospital Discharge:   Respiratory Treatments: ***  Oxygen Therapy:  {Therapy; copd oxygen:23062}  Ventilator:    {[facility] Vent ZERS:451575902}    Rehab Therapies: {THERAPEUTIC INTERVENTION:5992857252}  Weight Bearing Status/Restrictions: {[facility] Weight Bearin}  Other Medical Equipment (for information only, NOT a DME order):  {EQUIPMENT:821870121}  Other Treatments: ***    Patient's personal belongings (please select all that are sent with patient):  {P DME Belongings:901009507}    RN SIGNATURE:  {Esignature:310183965}    CASE MANAGEMENT/SOCIAL WORK SECTION    Inpatient Status Date: ***    Readmission Risk Assessment Score:  Readmission Risk              Risk of Unplanned Readmission:  10           Discharging to Facility/ Agency   Name:   Address:  Phone:  Fax:    Dialysis Facility (if applicable)   Name:  Address:  Dialysis Schedule:  Phone:  Fax:    / signature: {Esignature:059476360}    PHYSICIAN SECTION    Prognosis: Good    Condition at Discharge: Stable    Rehab Potential (if transferring to Rehab): Good    Recommended Labs or Other Treatments After Discharge: Physical Therapy    Physician Certification: I certify the above information and transfer of Xuan Sandhu  is necessary for the continuing treatment of the diagnosis listed and that he requires [address] for less 30 days.      Update Admission H&P: No change in H&P    PHYSICIAN SIGNATURE:  Electronically signed by Lennox Barrios PA-C on 23 at 8:08 AM EST

## 2023-02-21 NOTE — PROGRESS NOTES
[address] [address] [facility] Department   Phone: [phone]    Physical Therapy    [] Initial Evaluation            [x] Daily Treatment Note         [] Discharge Summary      Patient: Ayaan Perry   : 1950   MRN: [id number]   Date of Service:  2023  Admitting Diagnosis: Primary osteoarthritis of left knee    Current Admission Summary: Patient admitted for elective surgery, s/p L TKA 2023. Past Medical History:  has a past medical history of Arthritis, CAD (coronary artery disease), Cancer ([facility] [address] Chronic kidney disease, Hyperlipidemia, Hypertension, and S/P radiation therapy. Past Surgical History:  has a past surgical history that includes Coronary angioplasty with stent; hernia repair; and Total knee arthroplasty (Left, 2023). Discharge Recommendations: Ayaan Perry scored a 20/24 on the AM-PAC short mobility form. Current research shows that an AM-PAC score of 18 or greater is typically associated with a discharge to the patient's home setting. Based on the patient's AM-PAC score and their current functional mobility deficits, it is recommended that the patient have 2-3 sessions per week of Physical Therapy at d/c to increase the patient's independence. At this time, this patient demonstrates the endurance and safety to discharge home with home health PT and a follow up treatment frequency of 2-3x/wk. Please see assessment section for further patient specific details. If patient discharges prior to next session this note will serve as a discharge summary. Please see below for the latest assessment towards goals.        DME Required For Discharge: rolling walker  Precautions/Restrictions: high fall risk  Weight Bearing Restrictions: weight bearing as tolerated  [] Right Upper Extremity  [] Left Upper Extremity [] Right Lower Extremity  [x] Left Lower Extremity     Required Braces/Orthotics: no braces required   [] Right  [] Left  Positional Restrictions:no positional restrictions    Pre-Admission Information   Lives With: spouse    Type of Home: house  Home Layout: one level, able to live on main level  Home Access:  1 step to enter without rails   Bathroom Layout: walk in shower  Bathroom Equipment: shower chair  Toilet Height: standard height, elevated height  Home Equipment: rolling walker, single point cane  Transfer Assistance: Independent without use of device  Ambulation Assistance:Independent without use of device  ADL Assistance: independent with all ADL's  IADL Assistance: independent with homemaking tasks  Active :        [x] Yes  [] No  Hand Dominance: [] Left  [x] Right  Current Employment: retired. Occupation:   Hobbies: work part time for [employer]: Denies falls. Examination   ROM:   RLE WFL:  L knee flexion 95 degrees, extension 0 degrees  Strength:   RLE WFL; LLE grossly 3/5      Subjective  General: Patient lying supine in bed with HOB elevated upon arrival. Patient has visitor present. Patient is agreeable to PT. Patient reports need for bathroom. Pain: 5/10. Location: 5/10  Pain Interventions: RN notified, ice applied, and repositioned        Functional Mobility  Bed Mobility  Supine to Sit: stand by assistance  Bridging: stand by assistance  Comments:  Patient completed with HOB elevated with increased time   Transfers  Sit to stand transfer: contact guard assistance  Stand to sit transfer: contact guard assistance  Car transfer: stand by assistance  Comments:  Patient completed STS from EOB, therapy room chair, and car simulator with RW placed in front.  Patient requires VC for hand placement on RW  Ambulation  Surface:level surface  Assistive Device: rolling walker  Assistance: contact guard assistance  Distance: 140' + 140'  Gait Mechanics: dec'd virgil, reciprocal pattern  Comments:  Patient ambulated hallway to therapy gym with RW, SOB following ambulation requiring seated rest break for ~3 minutes prior to continuing activity  Stair Mobility  Number of Steps: 1  Step Height: 6 inch  Hand Rails: None  Device: rolling walker  Assistance: stand by assistance  Comments:  Reiterated \"up with the good, down with the bad\" patient demonstrates good carryover, requires VC for body placement prior to ascending/descending step  Wheelchair Mobility:  No w/c mobility completed on this date. Comments:  Balance  Static Sitting Balance: good: independent with functional balance in unsupported position  Dynamic Sitting Balance: fair (+): maintains balance at SBA/supervision without use of UE support  Static Standing Balance: fair (-): maintains balance at CGA with use of UE support  Dynamic Standing Balance: fair (-): maintains balance at CGA with use of UE support  Comments: Patient stood while urinating unsupported at SBA for ~2 minutes with no signs LOB     Other Therapeutic Interventions  Reviewed HEP exercises, positioned patient in tall back chair within room with application of ice  Removed ACE bandage, dressing intact, no signs of infection present    Second Session:  Patient seated in high back chair in room with ice applied to L knee upon arrival. Patient has a visitor present. Patient is agreeable to PT. Patient reporting confusion as to why he has not d/c at this time. RN reported patient is being monitored due to high BP. Patient performed STS from chair with RW placed in front at [facility]. Patient ambulated 125' with RW at [facility] requiring one standing rest break. Patient returned to chair within room, BP assessed in sitting at 214/81, RN notified. This PT provided patient with water, patient reporting no symptoms beyond normal at this time. Following seated rest break, second BP assessed in sitting at 190/86, RN notified. Patient educated on importance of hydration. Provided patient new ice bag for L knee which was donned by end of session. Patient's chair alarm on and all needs within reach.      Functional Outcomes  AM-PAC Inpatient Mobility Raw Score : 20              Cognition  WFL  Orientation:    alert and oriented x 4  Command Following:   [facility]    Education  Barriers To Learning: hearing  Patient Education: patient educated on goals, PT role and benefits, plan of care, weight-bearing education, general safety, functional mobility training, proper use of assistive device/equipment, family education, transfer training, discharge recommendations  Learning Assessment:  patient verbalizes and demonstrates understanding    Assessment  Activity Tolerance: Fair; limited by pain, ROM, and generalized weakness   Impairments Requiring Therapeutic Intervention: decreased functional mobility, decreased ADL status, decreased ROM, decreased strength, decreased endurance, decreased balance, increased pain  Prognosis: good  Clinical Assessment: Patient demonstrates improvement in overall ambulation distance this visit up to 140' 2x with RW, SOB noted following ambulation resulting in need for seated rest break to recover. Patient continues to require SBA-CGA with all functional mobility, demonstrates ability to complete stair mobility and car transfer this date with VC. Patient will continue to benefit from skilled PT in order to return to [facility] with all functional mobility prior to d/c from hospital.     Safety Interventions: patient left in chair, chair alarm in place, call light within reach, gait belt, patient at risk for falls, nurse notified, and family/caregiver present    Plan  Frequency: 7 x/week  Current Treatment Recommendations: strengthening, ROM, balance training, functional mobility training, transfer training, gait training, stair training, endurance training, modalities, patient/caregiver education, home exercise program, and equipment evaluation/education    Goals  Patient Goals: Return home tomorrow. Short Term Goals:  Time Frame: Discharge.   Patient will complete bed mobility at [facility] independent   Patient will complete transfers at [facility]   Patient will ambulate 150 ft with use of rolling walker at modified independent  Patient will ascend/descend 1 stairs without use of HR at [facility]  Patient will complete car transfer at stand by assistance; GOAL MET 2/21  ONE GOAL MET    Therapy Session Time      Individual Group Co-treatment   Time In 0759       Time Out 0852       Minutes 53         Timed Code Treatment Minutes:  48 Minutes     Second Session Therapy Time:   Individual Concurrent Group Co-treatment   Time In 1355         Time Out 1418         Minutes 23           Timed Code Treatment Minutes:  48 + 23    Total Treatment Minutes:  76 Minutes      Electronically Signed By: Roberto Botoh PT, DPT, [id number]

## 2023-02-21 NOTE — DISCHARGE SUMMARY
Physician Discharge Summary     Patient ID:  Mckenna Cheema  [id number]  67 y.o.  1950    Admit date: 2/20/2023    Discharge date and time: 2/21/2023     Admitting Physician: Elizabeth Alvarado MD     Discharge Physician: same    Admission Diagnoses: Osteoarthritis of left knee, unspecified osteoarthritis type [M17.12]  Primary osteoarthritis of left knee [M17.12]    Discharge Diagnoses: same    Admission Condition: good    Discharged Condition: good    Indication for Admission: Medical care following total joint replacement    Hospital Course: Patient tolerated the procedure well and stayed overnight as they were not ready for discharge same day. The patient did well with routine post total joint replacement care. The patient was seen by PT/OT and the medicine service during their stay (see their notes for detail). Once they were deamed safe for discharge, all discharge needs were arranged and the patient was discharged home to the care of their family. Consults: Hospitalist    Significant Diagnostic Studies: none    Treatments: IV hydration, antibiotics: Ancef, analgesia: acetaminophen and Oxycodone, anticoagulation: ASA and Plavix, therapies: PT and OT, and surgery: Let total knee arthroplasty    Discharge Exam:  Orientation:  alert and oriented to person, place and time     Left Lower Extremity     Incision:  dressing in place, clean, dry, and intact     Lower Extremity Motor :   Quadriceps:  5/5  Extensor hallucis:  5/5  Dorsiflexion:  5/5  Plantarflexion:  5/5     Lower Extremity Sensory:  Tibial Nerve:  intact  Superficial Peroneal Nerve:  intact  Deep Peroneal Nerve:  intact     Pulses:    Posterior Tibial:  2  Dorsalis Pedis:  2  Posterior Tibial Artery:  2     Abnormal Exam findings:  none    Disposition: home    In process/preliminary results:  Outstanding Order Results       No orders found for last 30 day(s).             Patient Instructions:   Current Discharge Medication List        START taking these medications    Details   diazePAM (VALIUM) 5 MG tablet Take 1 tablet by mouth every 8 hours as needed for Anxiety or Sleep for up to 7 days. Max Daily Amount: 15 mg  Qty: 21 tablet, Refills: 0    Associated Diagnoses: Post-op pain; Primary osteoarthritis of left knee      acetaminophen (TYLENOL) 500 MG tablet Take 1-2 tablets by mouth 4 times daily as needed for Pain  Qty: 120 tablet, Refills: 0      oxyCODONE (ROXICODONE) 5 MG immediate release tablet Take 1-2 tablets by mouth every 6 hours as needed for Pain for up to 7 days. Intended supply: 7 days.  Take lowest dose possible to manage pain Max Daily Amount: 40 mg  Qty: 50 tablet, Refills: 0    Comments: Reduce doses taken as pain becomes manageable  Associated Diagnoses: Post-op pain; Primary osteoarthritis of left knee           CONTINUE these medications which have NOT CHANGED    Details   Ferrous Sulfate (IRON) 325 (65 Fe) MG TABS Take 1 tablet by mouth once daily with breakfast  Qty: 90 tablet, Refills: 1      rosuvastatin (CRESTOR) 20 MG tablet Take 1 tablet by mouth daily  Qty: 90 tablet, Refills: 3      losartan-hydroCHLOROthiazide (HYZAAR) 100-25 MG per tablet Take 1 tablet by mouth daily  Qty: 90 tablet, Refills: 3      clopidogrel (PLAVIX) 75 MG tablet TAKE 1 TABLET BY MOUTH ONCE DAILY  Qty: 90 tablet, Refills: 3    Associated Diagnoses: Atherosclerosis of native coronary artery of native heart without angina pectoris; Essential hypertension, benign; Obstruction of carotid artery, bilateral; Erectile dysfunction, unspecified erectile dysfunction type      metoprolol tartrate (LOPRESSOR) 25 MG tablet Take 1 tablet by mouth 2 times daily  Qty: 180 tablet, Refills: 3    Associated Diagnoses: Atherosclerosis of native coronary artery of native heart without angina pectoris; Essential hypertension, benign; Obstruction of carotid artery, bilateral; Erectile dysfunction, unspecified erectile dysfunction type      calcium carbonate 1500 (600 Ca) MG TABS tablet Take 600 mg by mouth daily      tadalafil (CIALIS) 5 MG tablet TAKE ONE TABLET BY MOUTH DAILY AS NEEDED  FOR ERECTILE DYSFUNCTION  Qty: 8 tablet, Refills: 0    Associated Diagnoses: Atherosclerosis of native coronary artery of native heart without angina pectoris; Hyperlipidemia, unspecified hyperlipidemia type; Essential hypertension, benign; Obstruction of carotid artery, bilateral; Erectile dysfunction, unspecified erectile dysfunction type      Coenzyme Q10 (COQ10) 400 MG CAPS Take 1 capsule by mouth      aspirin EC 81 MG EC tablet Take 81 mg by mouth daily           Activity: activity as tolerated and no driving while on analgesics  Diet: regular diet  Wound Care: as directed    Follow-up with Dr. Doyle in 2 weeks.    Signed:  Eric Fuentes PA-C  2/21/2023  8:56 AM

## 2023-02-21 NOTE — OP NOTE
[place] [address]                     [facility], [address]                                OPERATIVE REPORT    PATIENT NAME: Laura Mayers                    :        1950  MED REC NO:   [id number]                          ROOM:       [id number]  ACCOUNT NO:   [de-identified]                           ADMIT DATE: 2023  PROVIDER:     Nataly Chávez MD    DATE OF PROCEDURE:  2023    PREOPERATIVE DIAGNOSIS:  Left knee end-stage osteoarthritis. POSTOPERATIVE DIAGNOSIS:  Left knee end-stage osteoarthritis. OPERATION PERFORMED:  Left total knee arthroplasty. PRIMARY SURGEON:  Nataly Chávez MD    ANESTHESIA:  General endotracheal.  The patient also did receive an  adductor canal block and an iPACK block. IMPLANTS:  DePuy Attune femoral component posterior stabilized size 5  narrow left, DePuy Attune tibial baseplate rotating platform size 5,  DePuy Attune anatomic medialized patella size 35, and DePuy Attune  tibial insert rotating platform posterior stabilized size 5 x 5 mm. BLOOD LOSS:  200 mL. TOURNIQUET TIME:  49 minutes at 300 mmHg. CONDITION:  The patient postoperatively was stable. HISTORY:  The patient is a 48-year-old gentleman who has a longstanding  history of left knee end-stage osteoarthritis that has failed  nonoperative management that continues to have significant left knee  pain that is affecting his quality of life and his ability to ambulate. We have discussed further treatment options. I did recommend left total  knee arthroplasty. After discussing the risks and benefits of that  procedure with him, informed consent was obtained. OPERATIVE PROCEDURE:  The patient was seen in the preoperative holding  area. The left knee was confirmed to be the operative extremity and it  was marked at that period of time. He did receive 2 gm of Ancef  preoperatively.   He was then brought back to the operating room in the  supine position. General endotracheal anesthesia was started per the  Anesthesia Service. He was then positioned supine on the operating room  table with all bony prominences padded. A tourniquet was placed upon  the patient's left thigh. The left lower extremity at that time was  prepped and draped in the standard sterile fashion. A standard anterior midline incision was made directly over the anterior  aspect of the left knee. It was carried down sharply through the skin  and subcutaneous tissues. A medial parapatellar arthrotomy at that time  was performed. The patella was everted laterally. Fat pad was excised. Soft tissue dissection continued along the medial subperiosteal soft  tissue sleeve. The joint was now fully exposed. The patient had  end-stage degenerative change that was tricompartmental in nature, but  worse within the medial compartment. A drill was used to open up the  femoral canal.  An intramedullary guide gabino was placed into the center  of the femoral canal.  Our distal femoral cutting block was pinned on  the distal end of the femur. Our distal femoral cut was completed  removing 9 mm of bone in 5 degrees of valgus. We then placed a PCL  retractor and subluxed the tibia anteriorly and extramedullary tibial  guide was used to measure resection at 10 mm off the high point of the  lateral tibial plateau. Our tibial cutting block was pinned  perpendicular to the anatomic axis of the tibia. Our tibial cut was  completed at that point in time with 3 degrees of posterior slope. Tibial bone block was removed, knee was brought on full extension. We  were able to fit the 5 mm extension space with good balancing of the  medial and lateral soft tissues. The knee was then brought back up into  flexion. We measured the size of the femur. The femur was measured to  be a size 5.   We pinned our four-in-one cutting block on the distal end  of the femur with 3 degrees of external rotation. We completed our  anterior, posterior, and chamfer cuts on the femur at that time. Our  box cutting guide was then placed on the femur and our box cut was also  completed. Residuals of the medial and lateral meniscus were removed at  that time as well as osteophytes off the posterior femoral condyles. PCL retractor once again was placed and tibia subluxed anteriorly. A  size 5 tibial baseplate had a good fit on the proximal tibia without  overhang, therefore the tibia was prepared with a reamer followed by a  keel punch. We left our trial tibia in place and placed a trial size 5  posterior stabilized femoral component on to the femur and trialed with  multiple size polyethylenes. With the size 5 x 5 mm polyethylene  insert, we had extension to 0 degrees and flexion up to 130 degrees with  good balancing of the medial and lateral soft tissues. Patella at that  time was everted. A measured resection was done on the back surface of  the patella to remove 10 mm of bone. We then reamed lug holes for a  size 35 anatomic medialized patella. We trialed with that patella in  place, which demonstrated excellent patellar tracking. Trial components  were removed from the knee at that time. The knee was washed with pulse  lavage. Soft tissues were injected with an Orthomix local anesthetic  solution and the cement was mixed on the back table at that time. Once the cement was ready, we cemented in our final components starting  with the size 5 rotating platform tibial baseplate, followed by a size 5  narrow left posterior stabilized femoral component followed by a size 35  anatomic medialized patella. A size 5 x 5 mm polyethylene insert was  held in the joint space with the knee in full extension with an axial  load while we waited for the cement to fully cure.   Once the cement  fully cured, we once again trialed the knee and the size 5 x 5 mm  polyethylene insert was found to be appropriate, therefore a final size  5 x 5 mm polyethylene insert was placed. The tourniquet at that time  was deflated. Electrocautery was used to obtain hemostasis within the  soft tissues. Capsular closure was done at that time with a #1  Stratafix suture, 2-0 Vicryl was used to re-approximate subcutaneous  tissues, and a 4-0 Monocryl was used to close the skin. Sterile  dressings at that time were placed over the knee. Knee was placed into  an Ace wrap. The patient at that time was awakened from anesthesia and  was transferred to PACU in stable condition.         Marlon Christensen MD    D: 02/20/2023 12:16:34       T: 02/20/2023 20:44:03     [email or web address]  Job#: [id number]     Doc#: [id number]    CC:

## 2023-02-21 NOTE — PROGRESS NOTES
Assisted pt to restroom where he voided and then back to bed. Pt is alert and oriented and rates his pain 3/10. Given scheduled Tylenol. Assessment done, VSS, call light in reach and bed alarm on, will continue to monitor.

## 2023-02-21 NOTE — CARE COORDINATION
02/21/23 0808   IMM Letter   Observation Status Letter date given: 02/21/23   Observation Status Letter time given: 0800   Observation Status Letter given to Patient/Family/Significant other/Guardian/POA/by: Patient       Copy given to the patient.     Linda Graf, MERN RN    [facility]  Phone: [phone]

## 2023-02-21 NOTE — PROGRESS NOTES
[facility]  Joint Service  Physician Assistant Progress Note        Subjective:  No complaints. Doing well, ready to go home today.     Vitals  VITALS:  BP (!) 147/63   Pulse 76   Temp 97.9 °F (36.6 °C) (Oral)   Resp 16   Ht 5' 6\" (1.676 m)   Wt 205 lb (93 kg)   SpO2 96%   BMI 33.09 kg/m²     PHYSICAL EXAM:    Orientation:  alert and oriented to person, place and time    Left Lower Extremity    Incision:  dressing in place, clean, dry, and intact    Lower Extremity Motor :   Quadriceps:  5/5  Extensor hallucis:  5/5  Dorsiflexion:  5/5  Plantarflexion:  5/5    Lower Extremity Sensory:  Tibial Nerve:  intact  Superficial Peroneal Nerve:  intact  Deep Peroneal Nerve:  intact    Pulses:    Posterior Tibial:  2  Dorsalis Pedis:  2  Posterior Tibial Artery:  2    Abnormal Exam findings:  none    Brace:  none    LABS:    HgB:    Lab Results   Component Value Date/Time    HGB 12.2 02/20/2023 12:49 PM     INR:  No results found for: PTINR  CBC with Differential:    Lab Results   Component Value Date/Time    WBC 5.8 01/31/2023 11:38 AM    RBC 4.45 01/31/2023 11:38 AM    HGB 12.2 02/20/2023 12:49 PM    HCT 37.7 02/20/2023 12:49 PM     01/31/2023 11:38 AM    MCV 86.3 01/31/2023 11:38 AM    MCH 28.7 01/31/2023 11:38 AM    MCHC 33.3 01/31/2023 11:38 AM    RDW 15.3 01/31/2023 11:38 AM    SEGSPCT 54 11/28/2022 11:10 AM    LYMPHOPCT 37.0 01/31/2023 11:38 AM    MONOPCT 5.0 01/31/2023 11:38 AM    BASOPCT 1.0 01/31/2023 11:38 AM    MONOSABS 0.3 01/31/2023 11:38 AM    LYMPHSABS 2.1 01/31/2023 11:38 AM    EOSABS 0.0 01/31/2023 11:38 AM    BASOSABS 0.1 01/31/2023 11:38 AM    DIFFTYPE Auto-K 09/21/2012 11:31 AM     Hemoglobin/Hematocrit:    Lab Results   Component Value Date/Time    HGB 12.2 02/20/2023 12:49 PM    HCT 37.7 02/20/2023 12:49 PM     BMP:    Lab Results   Component Value Date/Time     01/31/2023 11:38 AM    K 3.4 01/31/2023 11:38 AM     01/31/2023 11:38 AM    CO2 28 01/31/2023 11:38 AM    BUN 20 01/31/2023 11:38 AM    LABALBU 4.1 11/28/2022 11:10 AM    CREATININE 1.1 01/31/2023 11:38 AM    CALCIUM 9.7 01/31/2023 11:38 AM    GFRAA >60 05/31/2022 10:03 AM    GFRAA >60 09/21/2012 11:31 AM    LABGLOM >60 01/31/2023 11:38 AM    GLUCOSE 122 01/31/2023 11:38 AM    GLUCOSE 108 02/11/2022 12:35 PM       ASSESSMENT AND PLAN:    Post operative day 1 status post left total knee arthroplasty    1:  Weight bearing as tolerated  2:  Deep venous thrombosis prophylaxis - Plavix and ASA  3:  Continue physical therapy  4:  D/C Plan:  Home Health  5:  Pain Control:  Well controlled on Tylenol and Oxycodone  6:  Plan I sto DC home later today.    Eric Fuentes PA-C  [facility]

## 2023-02-21 NOTE — PROGRESS NOTES
[facility]   Phone: [phone]    Occupational Therapy    [] Initial Evaluation            [x] Daily Treatment Note         [] Discharge Summary      Patient: Mohan Pham   : 1950   MRN: [id number]   Date of Service:  2023    Admitting Diagnosis:  Primary osteoarthritis of left knee  Current Admission Summary: Patient admitted for elective surgery, s/p L TKA 2023. Past Medical History:  has a past medical history of Arthritis, CAD (coronary artery disease), Cancer ([facility] [address] Chronic kidney disease, Hyperlipidemia, Hypertension, and S/P radiation therapy. Past Surgical History:  has a past surgical history that includes Coronary angioplasty with stent; hernia repair; and Total knee arthroplasty (Left, 2023). Discharge Recommendations: Mohan Pham scored a 22/24 on the AM-PAC ADL Inpatient form. Current research shows that an AM-PAC score of 18 or greater is typically associated with a discharge to the patient's home setting. Based on the patient's AM-PAC score, and their current ADL deficits, it is recommended that the patient have 2-3 sessions per week of Occupational Therapy at d/c to increase the patient's independence. At this time, this patient demonstrates the endurance and safety to discharge home with home health care OT services and a follow up treatment frequency of 2-3x/wk. Please see assessment section for further patient specific details. HOME HEALTH CARE: LEVEL 1 STANDARD    - Initial home health evaluation to occur within 24-48 hours, in patient home   - Therapy to evaluate with goal of regaining prior level of functioning   - Therapy to evaluate if patient has [address] needs for personal care      If patient discharges prior to next session this note will serve as a discharge summary. Please see below for the latest assessment towards goals.       DME Required For Discharge: no DME required at discharge    Precautions/Restrictions: low fall risk  Weight Bearing Restrictions: weight bearing as tolerated  [] Right Upper Extremity  [] Left Upper Extremity [] Right Lower Extremity  [x] Left Lower Extremity     Required Braces/Orthotics: no braces required   [] Right  [] Left  Positional Restrictions:no positional restrictions    Pre-Admission Information   Lives With: spouse                  Type of Home: house  Home Layout: one level, able to live on main level  Home Access:  1 step to enter without rails   Bathroom Layout: walk in shower  Bathroom Equipment: shower chair  Toilet Height: standard height, elevated height  Home Equipment: rolling walker, single point cane  Transfer Assistance: Independent without use of device  Ambulation Assistance:Independent without use of device  ADL Assistance: independent with all ADL's  IADL Assistance: independent with homemaking tasks  Active :        [x] Yes                 [] No  Hand Dominance: [] Left                 [x] Right  Current Employment: retired. Occupation:   Hobbies: work part time for [employer]: Denies falls. Subjective  General: Pt seated in chair upon arrival with wife present; agreeable to OT treatment  Pain: 3/10. Location: LLE  Pain Interventions: pain medication in place prior to arrival     Activities of Daily Living  Basic Activities of Daily Living  Upper Extremity Dressing: setup assistance  Lower Extremity Dressing: minimal assistance  Dressing Comments: Doffed gown and donned regular clothing for discharge; underwear, sweatpants, long sleeve shirt with assistance for threading underwear/pants  Toileting: stand by assistance. Toileting Comments: Urinated standing at toilet  General Comments: Pt declined additional ADLs due to d/c   Instrumental Activities of Daily Living  No IADL completed on this date. Functional Mobility  Bed Mobility  Bed mobility not completed on this date.   Comments: Pt seated in chair upon arrival and end of session  Transfers  Sit to stand transfer:stand by assistance  Stand to sit transfer: stand by assistance  Comments: Chair > ambulating > toilet > ambulating > chair   Functional Mobility:  Functional Mobility: .  stand by assistance  Functional Mobility Activity: to/from bathroom, in hallway  Functional Mobility Device Use: rolling walker  Functional Mobility Comment: Pt ambulated ~80 ft in hallway with initial cue for RW management/safety      Other Therapeutic Interventions    Functional Outcomes  AM-PAC Inpatient Daily Activity Raw Score: 22    Cognition  WFL  Comments: [facility]  Orientation:    alert and oriented x 4  Command Following:   [facility]     Education  Barriers To Learning: hearing  Patient Education: patient educated on goals, OT role and benefits, plan of care, precautions, ADL adaptive strategies, weight-bearing education, energy conservation, family education, disease specific education, transfer training, discharge recommendations  Learning Assessment:  patient verbalizes understanding, would benefit from continued reinforcement    Assessment  Activity Tolerance: Pt tolerated activities well, with mild SOB following functional mobility and toileting. Impairments Requiring Therapeutic Intervention: decreased functional mobility, decreased ADL status, decreased safety awareness, decreased endurance, decreased coordination, increased pain  Prognosis: good  Clinical Assessment: Pt currently functioning below baseline requiring SBA for transfers/functional mobility, and min A with lower body dressing. Pt would benefit from continued skilled OT services to address above deficits and return to PLOF.     Safety Interventions: patient left in chair, chair alarm in place, call light within reach, gait belt, nurse notified, and family/caregiver present    Plan  Frequency: 7 x/week  Current Treatment Recommendations: balance training, functional mobility training, transfer training, endurance training, patient/caregiver education, ADL/self-care training, IADL training, home management training, pain management, home exercise program, and safety education    Goals  Patient Goals: Patient did not state    Short Term Goals:  Time Frame: discharge   Patient will complete lower body ADL at modified independent   Patient will complete toileting at modified independent   Patient will complete functional transfers at modified independent   Patient will complete functional mobility at modified independent     Pt progressing towards goals above; continue goals 2/21/23    Therapy Session Time     Individual Group Co-treatment   Time In 1030     Time Out 1056     Minutes 26          Timed Code Treatment Minutes:  Timed Code Treatment Minutes: 26 Minutes  Total Treatment Minutes:  26 minutes        Electronically Signed By: Arnetta Lennox, OTR/HAYDEN C/VIDA ([id number])    Beni Blancas, S/OT     Occupational Therapist was present, directed the patient's care, made skilled judgement, and was responsible for assessment and treatment of the patient.

## 2023-02-22 VITALS
SYSTOLIC BLOOD PRESSURE: 132 MMHG | HEART RATE: 58 BPM | TEMPERATURE: 98.1 F | WEIGHT: 205 LBS | BODY MASS INDEX: 32.95 KG/M2 | OXYGEN SATURATION: 94 % | RESPIRATION RATE: 18 BRPM | DIASTOLIC BLOOD PRESSURE: 76 MMHG | HEIGHT: 66 IN

## 2023-02-22 LAB
ANION GAP SERPL CALCULATED.3IONS-SCNC: 9 MMOL/L (ref 3–16)
BUN BLDV-MCNC: 21 MG/DL (ref 7–20)
CALCIUM SERPL-MCNC: 9.5 MG/DL (ref 8.3–10.6)
CHLORIDE BLD-SCNC: 104 MMOL/L (ref 99–110)
CO2: 26 MMOL/L (ref 21–32)
CREAT SERPL-MCNC: 1.2 MG/DL (ref 0.8–1.3)
GFR SERPL CREATININE-BSD FRML MDRD: >60 ML/MIN/{1.73_M2}
GLUCOSE BLD-MCNC: 103 MG/DL (ref 70–99)
GLUCOSE BLD-MCNC: 121 MG/DL (ref 70–99)
GLUCOSE BLD-MCNC: 87 MG/DL (ref 70–99)
HCT VFR BLD CALC: 33.6 % (ref 40.5–52.5)
HEMOGLOBIN: 11.2 G/DL (ref 13.5–17.5)
MCH RBC QN AUTO: 28.7 PG (ref 26–34)
MCHC RBC AUTO-ENTMCNC: 33.2 G/DL (ref 31–36)
MCV RBC AUTO: 86.2 FL (ref 80–100)
PDW BLD-RTO: 15.3 % (ref 12.4–15.4)
PERFORMED ON: ABNORMAL
PERFORMED ON: NORMAL
PLATELET # BLD: 186 K/UL (ref 135–450)
PMV BLD AUTO: 8.3 FL (ref 5–10.5)
POTASSIUM SERPL-SCNC: 3.6 MMOL/L (ref 3.5–5.1)
RBC # BLD: 3.9 M/UL (ref 4.2–5.9)
SODIUM BLD-SCNC: 139 MMOL/L (ref 136–145)
WBC # BLD: 11.3 K/UL (ref 4–11)

## 2023-02-22 PROCEDURE — 85027 COMPLETE CBC AUTOMATED: CPT

## 2023-02-22 PROCEDURE — 97116 GAIT TRAINING THERAPY: CPT

## 2023-02-22 PROCEDURE — 6370000000 HC RX 637 (ALT 250 FOR IP): Performed by: INTERNAL MEDICINE

## 2023-02-22 PROCEDURE — 2700000000 HC OXYGEN THERAPY PER DAY

## 2023-02-22 PROCEDURE — 36415 COLL VENOUS BLD VENIPUNCTURE: CPT

## 2023-02-22 PROCEDURE — 94760 N-INVAS EAR/PLS OXIMETRY 1: CPT

## 2023-02-22 PROCEDURE — 6370000000 HC RX 637 (ALT 250 FOR IP): Performed by: ORTHOPAEDIC SURGERY

## 2023-02-22 PROCEDURE — 80048 BASIC METABOLIC PNL TOTAL CA: CPT

## 2023-02-22 PROCEDURE — 97535 SELF CARE MNGMENT TRAINING: CPT

## 2023-02-22 PROCEDURE — 97110 THERAPEUTIC EXERCISES: CPT

## 2023-02-22 PROCEDURE — 97530 THERAPEUTIC ACTIVITIES: CPT

## 2023-02-22 RX ADMIN — OXYCODONE HYDROCHLORIDE 10 MG: 5 TABLET ORAL at 03:22

## 2023-02-22 RX ADMIN — LOSARTAN POTASSIUM 100 MG: 100 TABLET, FILM COATED ORAL at 09:39

## 2023-02-22 RX ADMIN — ACETAMINOPHEN 650 MG: 325 TABLET ORAL at 09:39

## 2023-02-22 RX ADMIN — ACETAMINOPHEN 650 MG: 325 TABLET ORAL at 03:22

## 2023-02-22 RX ADMIN — ROSUVASTATIN 20 MG: 20 TABLET, FILM COATED ORAL at 09:39

## 2023-02-22 RX ADMIN — HYDROCHLOROTHIAZIDE 25 MG: 25 TABLET ORAL at 09:39

## 2023-02-22 RX ADMIN — METOPROLOL TARTRATE 50 MG: 50 TABLET ORAL at 09:39

## 2023-02-22 RX ADMIN — OXYCODONE HYDROCHLORIDE 10 MG: 5 TABLET ORAL at 09:42

## 2023-02-22 RX ADMIN — CLOPIDOGREL BISULFATE 75 MG: 75 TABLET ORAL at 09:39

## 2023-02-22 ASSESSMENT — PAIN DESCRIPTION - ORIENTATION
ORIENTATION: LEFT

## 2023-02-22 ASSESSMENT — PAIN DESCRIPTION - DESCRIPTORS
DESCRIPTORS: ACHING

## 2023-02-22 ASSESSMENT — PAIN SCALES - GENERAL
PAINLEVEL_OUTOF10: 3
PAINLEVEL_OUTOF10: 6
PAINLEVEL_OUTOF10: 1
PAINLEVEL_OUTOF10: 5
PAINLEVEL_OUTOF10: 5

## 2023-02-22 ASSESSMENT — PAIN DESCRIPTION - LOCATION
LOCATION: KNEE

## 2023-02-22 ASSESSMENT — PAIN DESCRIPTION - PAIN TYPE
TYPE: SURGICAL PAIN
TYPE: SURGICAL PAIN

## 2023-02-22 ASSESSMENT — PAIN - FUNCTIONAL ASSESSMENT: PAIN_FUNCTIONAL_ASSESSMENT: PREVENTS OR INTERFERES SOME ACTIVE ACTIVITIES AND ADLS

## 2023-02-22 NOTE — PROGRESS NOTES
[facility] Department   Phone: [phone]    Occupational Therapy    [] Initial Evaluation            [x] Daily Treatment Note         [] Discharge Summary      Patient: Cezar Iqbal   : 1950   MRN: [id number]   Date of Service:  2023    Admitting Diagnosis:  Primary osteoarthritis of left knee  Current Admission Summary: Patient admitted for elective surgery, s/p L TKA 2023. Past Medical History:  has a past medical history of Arthritis, CAD (coronary artery disease), Cancer ([facility] [address] Chronic kidney disease, Hyperlipidemia, Hypertension, and S/P radiation therapy. Past Surgical History:  has a past surgical history that includes Coronary angioplasty with stent; hernia repair; and Total knee arthroplasty (Left, 2023). Discharge Recommendations: Cezar Iqbal scored a 23/24 on the AM-PAC ADL Inpatient form. Current research shows that an AM-PAC score of 18 or greater is typically associated with a discharge to the patient's home setting. Based on the patient's AM-PAC score, and their current ADL deficits, it is recommended that the patient have 2-3 sessions per week of Occupational Therapy at d/c to increase the patient's independence. At this time, this patient demonstrates the endurance and safety to discharge home with home health care OT services and a follow up treatment frequency of 2-3x/wk. Please see assessment section for further patient specific details. HOME HEALTH CARE: LEVEL 1 STANDARD    - Initial home health evaluation to occur within 24-48 hours, in patient home   - Therapy to evaluate with goal of regaining prior level of functioning   - Therapy to evaluate if patient has [address] needs for personal care      If patient discharges prior to next session this note will serve as a discharge summary. Please see below for the latest assessment towards goals.       DME Required For Discharge: no DME required at discharge    Precautions/Restrictions: low fall risk  Weight Bearing Restrictions: weight bearing as tolerated  [] Right Upper Extremity  [] Left Upper Extremity [] Right Lower Extremity  [x] Left Lower Extremity     Required Braces/Orthotics: no braces required   [] Right  [] Left  Positional Restrictions:no positional restrictions    Pre-Admission Information   Lives With: spouse                  Type of Home: house  Home Layout: one level, able to live on main level  Home Access:  1 step to enter without rails   Bathroom Layout: walk in shower  Bathroom Equipment: shower chair  Toilet Height: standard height, elevated height  Home Equipment: rolling walker, single point cane  Transfer Assistance: Independent without use of device  Ambulation Assistance:Independent without use of device  ADL Assistance: independent with all ADL's  IADL Assistance: independent with homemaking tasks  Active :        [x] Yes                 [] No  Hand Dominance: [] Left                 [x] Right  Current Employment: retired.  Occupation:   Hobbies: work part time for [employer]  Recent Falls: Denies falls.         Subjective  General: Pt supine in bed upon arrival, just completing PT session and wife present throughout. Pt requesting full shower as his home shower is walk-in but small.   Pain: 3/10.  Location: LLE  Pain Interventions: pain medication in place prior to arrival     Activities of Daily Living  Basic Activities of Daily Living  Upper Extremity Bathing: supervision  Lower Extremity Bathing: supervision   Bathing Equipment: none  Bathing Comments: Pt completed full shower seated on shower chair without back, lateral lean R><L for posterior joe-wash.   Upper Extremity Dressing: setup assistance  Lower Extremity Dressing: minimal assistance  Dressing Comments: Doff/Morro underwear, sweatpants, long sleeve shirt with assistance for L LE threading underwear/pants. Pt demo'd ability to pull over hips in stance w/CGA.  Toileting: stand by assistance. Toileting Comments: Urinated standing at toilet  General Comments: Pt declined additional ADLs due to d/c     Instrumental Activities of Daily Living  No IADL completed on this date. Functional Mobility  Bed Mobility  Supine to Sit: stand by assistance  Sit to Supine: stand by assistance  Scooting: stand by assistance  Comments: HOB slightly elevated, increased time and use of HR  Transfers  Sit to stand transfer:stand by assistance  Stand to sit transfer: stand by assistance  Comments: EOB > ambulating > toilet > ambulating > shower chair > ambulating > EOB    Functional Mobility:  Sitting Balance: supervision. Sitting Balance Comment: Shower ADL  Standing Balance: {ffptassistlevels (Optional):74044}. Functional Mobility: . {ffptassistlevels (Optional):88945}  Functional Mobility Activity: {ffotmobilityactivity:19155}  Functional Mobility Device Use: {ffptambulationdevices (Optional):72787}  Functional Mobility Comment: Pt ambulated ~80 ft in hallway with initial cue for RW management/safety      Other Therapeutic Interventions    Functional Outcomes  -PAC Inpatient Daily Activity Raw Score: 23    Cognition  WFL  Comments: [facility]  Orientation:    alert and oriented x 4  Command Following:   [facility]     Education  Barriers To Learning: hearing  Patient Education: patient educated on goals, OT role and benefits, plan of care, precautions, ADL adaptive strategies, weight-bearing education, energy conservation, family education, disease specific education, transfer training, discharge recommendations  Learning Assessment:  patient verbalizes understanding, would benefit from continued reinforcement    Assessment  Activity Tolerance: Pt tolerated activities well, with mild SOB following functional mobility and toileting.    Impairments Requiring Therapeutic Intervention: decreased functional mobility, decreased ADL status, decreased safety awareness, decreased endurance, decreased coordination, increased pain  Prognosis: good  Clinical Assessment: Pt currently functioning below baseline requiring SBA for transfers/functional mobility, and min A with lower body dressing. Pt would benefit from continued skilled OT services to address above deficits and return to PLOF. Safety Interventions: patient left in chair, chair alarm in place, call light within reach, gait belt, nurse notified, and family/caregiver present    Plan  Frequency: 7 x/week  Current Treatment Recommendations: balance training, functional mobility training, transfer training, endurance training, patient/caregiver education, ADL/self-care training, IADL training, home management training, pain management, home exercise program, and safety education    Goals  Patient Goals: Patient did not state    Short Term Goals:  Time Frame: discharge   Patient will complete lower body ADL at modified independent   Patient will complete toileting at modified independent   Patient will complete functional transfers at modified independent   Patient will complete functional mobility at modified independent     Pt progressing towards goals above; continue goals 2/21/23    Therapy Session Time     Individual Group Co-treatment   Time In 1116     Time Out 1154     Minutes 38          Timed Code Treatment Minutes:  Timed Code Treatment Minutes: 26 Minutes  Total Treatment Minutes:  26 minutes        Electronically Signed By: CM Arreola/L, C/NDT ([id number])    Holley Gregory, S/OT     Occupational Therapist was present, directed the patient's care, made skilled judgement, and was responsible for assessment and treatment of the patient.

## 2023-02-22 NOTE — PLAN OF CARE
Problem: Chronic Conditions and Co-morbidities  Goal: Patient's chronic conditions and co-morbidity symptoms are monitored and maintained or improved  Outcome: Progressing  Flowsheets (Taken 2/22/2023 0337)  Care Plan - Patient's Chronic Conditions and Co-Morbidity Symptoms are Monitored and Maintained or Improved: Monitor and assess patient's chronic conditions and comorbid symptoms for stability, deterioration, or improvement     Problem: Pain  Goal: Verbalizes/displays adequate comfort level or baseline comfort level  Outcome: Progressing  Flowsheets (Taken 2/22/2023 0337)  Verbalizes/displays adequate comfort level or baseline comfort level: Assess pain using appropriate pain scale     Problem: Discharge Planning  Goal: Discharge to home or other facility with appropriate resources  Outcome: Progressing  Flowsheets (Taken 2/22/2023 7229)  Discharge to home or other facility with appropriate resources: Identify discharge learning needs (meds, wound care, etc)     Problem: ABCDS Injury Assessment  Goal: Absence of physical injury  Outcome: Progressing  Flowsheets (Taken 2/22/2023 0337)  Absence of Physical Injury: Implement safety measures based on patient assessment

## 2023-02-22 NOTE — PROGRESS NOTES
[facility] Department   Phone: [phone]    Physical Therapy    [] Initial Evaluation            [x] Daily Treatment Note         [x] Discharge Summary      Patient: Danielle Castano   : 1950   MRN: [id number]   Date of Service:  2023  Admitting Diagnosis: Primary osteoarthritis of left knee    Current Admission Summary: Patient admitted for elective surgery, s/p L TKA 2023. Past Medical History:  has a past medical history of Arthritis, CAD (coronary artery disease), Cancer ([facility] [address] Chronic kidney disease, Hyperlipidemia, Hypertension, and S/P radiation therapy. Past Surgical History:  has a past surgical history that includes Coronary angioplasty with stent; hernia repair; and Total knee arthroplasty (Left, 2023). Discharge Recommendations: Danielle Castano scored a 22/24 on the AM-PAC short mobility form. Current research shows that an AM-PAC score of 18 or greater is typically associated with a discharge to the patient's home setting. Based on the patient's AM-PAC score and their current functional mobility deficits, it is recommended that the patient have 2-3 sessions per week of Physical Therapy at d/c to increase the patient's independence. At this time, this patient demonstrates the endurance and safety to discharge home with OPPT and a follow up treatment frequency of 2-3x/wk. Please see assessment section for further patient specific details. If patient discharges prior to next session this note will serve as a discharge summary. Please see below for the latest assessment towards goals.        DME Required For Discharge: rolling walker  Precautions/Restrictions: high fall risk  Weight Bearing Restrictions: weight bearing as tolerated  [] Right Upper Extremity  [] Left Upper Extremity [] Right Lower Extremity  [x] Left Lower Extremity     Required Braces/Orthotics: no braces required   [] Right  [] Left  Positional Restrictions:no positional restrictions    Pre-Admission Information   Lives With: spouse    Type of Home: house  Home Layout: one level, able to live on main level  Home Access:  1 step to enter without rails   Bathroom Layout: walk in shower  Bathroom Equipment: shower chair  Toilet Height: standard height, elevated height  Home Equipment: rolling walker, single point cane  Transfer Assistance: Independent without use of device  Ambulation Assistance:Independent without use of device  ADL Assistance: independent with all ADL's  IADL Assistance: independent with homemaking tasks  Active :        [x] Yes  [] No  Hand Dominance: [] Left  [x] Right  Current Employment: retired. Occupation:   Hobbies: work part time for [employer]: Denies falls. Examination   ROM:   RLE WFL:  L knee flexion 95 degrees, extension lacks 15 degrees  Strength:   RLE WFL; LLE grossly 3/5      Subjective  General: Patient lying supine in bed with HOB elevated upon arrival. Patient has visitor present. Patient is agreeable to PT. Patient reports need for bathroom. Pain: 3/10. Location: L knee  Pain Interventions: RN notified, ice applied, and repositioned        Functional Mobility  Bed Mobility  Supine to Sit: modified independent  Sit to Supine: modified independent  Scooting: modified independent  Bridging: modified independent  Comments:  Patient completed with HOB flat, no rail and increased time, Used R LE to assist L LE into bed  Transfers  Sit to stand transfer: modified independent  Stand to sit transfer: modified independent  Car transfer: supervision  Comments:  Patient completed STS from EOB, therapy room chair, and car simulator with RW placed in front. Once initially reminded Pt. Of hand placement, Pt. Able to remember for multiple stands.   Ambulation  Surface:level surface  Assistive Device: rolling walker  Assistance: modified independent, supervision  Distance: ~175' x 2   Gait Mechanics: dec'd virgil, reciprocal pattern, encouragement to increase step length on the L and stay up in the walker. Comments:  Patient ambulated hallway to therapy gym with RW  Stair Mobility  Number of Steps: 1 curb x 2  + 4 reciprocal  Step Height: 6 inch  Hand Rails: (B) handrail  Device: rolling walker  Assistance: supervision  Comments:  Reiterated \"up with the good, down with the bad\" patient demonstrates good carryover. Wife present to observe the process. Used rolling walker for the curb step and rails for the multiple steps. Wheelchair Mobility:  No w/c mobility completed on this date. Comments:  Balance  Static Sitting Balance: good: independent with functional balance in unsupported position  Dynamic Sitting Balance: good: independent with functional balance in unsupported position  Static Standing Balance: fair (+): maintains balance at SBA/supervision without use of UE support  Dynamic Standing Balance: fair (+): maintains balance at SBA/supervision without use of UE support  Comments: Patient stood while urinating unsupported at SBA for ~2 minutes with no signs LOB     Other Therapeutic Interventions  Reviewed HEP exercises with focus on ROM, completed 10 reps of quad sets L LE, 2 minutes of passive hamstring stretching with roll under L ankle d/t Lack of 15 degrees of extension. Pt. Completed 10 reps of heel slides with assist of a belt. Ice placed to L knee at end of session. Educated to NOT place pillow under L knee.         Functional Outcomes  AM-PAC Inpatient Mobility Raw Score : 22              Cognition  WFL  Orientation:    alert and oriented x 4  Command Following:   [facility]    Education  Barriers To Learning: hearing  Patient Education: patient educated on goals, PT role and benefits, plan of care, weight-bearing education, general safety, functional mobility training, proper use of assistive device/equipment, family education, transfer training, discharge recommendations  Learning Assessment:  patient verbalizes and demonstrates understanding    Assessment  Activity Tolerance: Fair; limited by pain, ROM, and generalized weakness   Impairments Requiring Therapeutic Intervention: decreased functional mobility, decreased ADL status, decreased ROM, decreased strength, decreased endurance, decreased balance, increased pain  Prognosis: good  Clinical Assessment: Pt. Progressing well. Met     Safety Interventions: patient left in chair, chair alarm in place, call light within reach, gait belt, patient at risk for falls, nurse notified, and family/caregiver present    Plan  Frequency: 7 x/week  Current Treatment Recommendations: strengthening, ROM, balance training, functional mobility training, transfer training, gait training, stair training, endurance training, modalities, patient/caregiver education, home exercise program, and equipment evaluation/education    Goals  Patient Goals: Return home tomorrow. Short Term Goals:  Time Frame: Discharge.   Patient will complete bed mobility at modified independent  - Goal met 2/22  Patient will complete transfers at modified independent   - Goal met 2/22  Patient will ambulate 150 ft with use of rolling walker at modified independent - Goal met 2/22  Patient will ascend/descend 1 stairs without use of HR at modified independent  Patient will complete car transfer at stand by assistance; GOAL MET 2/21  4/5 GOALs MET    Therapy Session Time      Individual Group Co-treatment   Time In 1025       Time Out 1120       Minutes 55         Timed Code Treatment Minutes:  55 Minutes         Electronically Signed By: Keo Shoemaker, [address] [facility], [id number]

## 2023-02-22 NOTE — PROGRESS NOTES
Hospital Problems             Last Modified POA    * (Principal) Primary osteoarthritis of left knee 2/20/2023 Yes    Uncontrolled hypertension 2/21/2023 Yes    ASHD (arteriosclerotic heart disease) 2/21/2023 Yes    Overview Signed 7/7/2011  1:09 PM by Lucero Townsend MD     2007 cath> POLLY of RCA    March 2010 Myoview> small inferior fixed defect, EF 71%           Nocturnal hypoxemia 2/21/2023 Yes    Obstructive sleep apnea 2/21/2023 Yes    Hyperlipidemia 2/21/2023 Yes    Overview Signed 7/7/2011  1:07 PM by Lucero Townsend MD     Last LDL 77 from Sept. 2010        H&P dictated

## 2023-02-22 NOTE — H&P
[facility]                     [facility], [address]                              HISTORY AND PHYSICAL    PATIENT NAME: Latoya Heck                    :        1950  MED REC NO:   [id number]                          ROOM:       7145  ACCOUNT NO:   [de-identified]                           ADMIT DATE: 2023  PROVIDER:     Juancarlos Denton MD    HISTORY OF PRESENT ILLNESS:  The patient is a 74-year-old white American  gentleman who was admitted by Dr. Dontae Mendoza for total knee  replacement of the left for advance osteoarthritis of the left knee. I  have been asked to see this patient in internal medicine consultation  comanagement because his primary care physician does not come to this  hospital, which is Dr. Bhumika Raymond and his blood pressures have been  running high. I have been asked to see this patient for internal  medicine consultation comanagement. The patient denies any chest pain. No shortness of breath. No orthopnea. No TIA symptoms. No urinary  difficulty. Postoperative pain is under control. The patient is in  fact anxious to get out of the hospital and go home. PAST MEDICAL HISTORY:  Pertinent for osteoarthritis, atherosclerotic  heart disease, uncontrolled hypertension, prostate cancer in remission,  chronic renal insufficiency, hyperlipidemia, hypertension, status post  radiation therapy. PAST SURGICAL HISTORY:  Pertinent for total knee arthroplasty, coronary  angioplasty with stent placement, and hernia repair. FAMILY HISTORY:  Mother is alive and has diabetes. Father is   because of atherosclerotic heart disease. SOCIAL HISTORY:  He is a  man. He has one daughter. He was  always a non-smoker, would have an occasional alcoholic beverage. 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 No history of  substance abuse.     MEDICATIONS:  Metoprolol that he is supposed to take it twice a day, but  he only takes once a day 25 mg, acetaminophen, Plavix, HydroDIURIL,  losartan, metoprolol, Crestor. ALLERGIES:  He is allergic to STATINS. REVIEW OF SYSTEMS:  Negative for loss of consciousness. No TIA. No  speech disturbance. No dysphagia. The patient is moderately obese with  a BMI of 33. No visual blurring. No angina pectoris. No resting or  exertional shortness of breath. No orthopnea. No abdominal pain. No  hematemesis. No melena. The patient does have obstructive sleep apnea  and he uses oxygen 2 L at night for nocturnal hypoxemia. No  genitourinary complaints. Does have left knee postoperative pain. PHYSICAL EXAMINATION:  GENERAL:  He is alert, awake, and oriented x3, a mildly-distressed  48-year-old white American gentleman looking consistent with his stated  age. VITAL SIGNS:  His temperature is 97.5; blood pressure 152/76, 175/70,  194/76 and dropped down to 164/76; O2 sat 94% on room air. HEENT:  Oral mucosa is dry. SKIN:  Warm and dry. NECK:  Supple. Mild jugular venous distention. No carotid bruit. No  thyromegaly. LUNGS:  Clear to auscultation. HEART:  Regular rate and rhythm, S1 and S2.  1/6 systolic ejection  murmur. No gallop rhythm. ABDOMEN:  Soft and nontender. Bowel sounds are present. EXTREMITIES:  Trace edema. Postoperative site looks apparently healthy  and free from complications. NEUROLOGIC:  Intact. LABORATORY EVALUATION:  No recent labs. Hemoglobin 12.2, hematocrit  37.7. No fall postoperatively. ASSESSMENT:  Uncontrolled hypertension, nocturnal hypoxemia, primary  osteoarthritis of the left knee, obstructive sleep apnea, nocturnal  hypoxemia, hyperlipidemia, atherosclerotic heart disease. PLAN:  Get him admitted. Put him on metoprolol 25 mg twice a day. Continuation of losartan/HCTZ. Put him on nocturnal oxygen 2 L as like  at home.     It was a pleasure to participate in internal medicine consultation  comanagement of this patient with you, Dr. Ayala Brochure.         Janeen Solorzano MD    D: 02/21/2023 22:47:27       T: 02/22/2023 0:14:44     [email or web address]  Job#: [id number]     Doc#: [id number]    CC:  Kitty Garrido MD

## 2023-02-23 ENCOUNTER — CARE COORDINATION (OUTPATIENT)
Dept: CASE MANAGEMENT | Age: 73
End: 2023-02-23

## 2023-02-23 DIAGNOSIS — M17.12 PRIMARY OSTEOARTHRITIS OF LEFT KNEE: Primary | ICD-10-CM

## 2023-02-23 PROCEDURE — 1111F DSCHRG MED/CURRENT MED MERGE: CPT | Performed by: INTERNAL MEDICINE

## 2023-02-23 NOTE — CARE COORDINATION
[facility] Care Transitions Initial Follow Up Call    Call within 2 business days of discharge: Yes    Patient Current Location:  Home: [address] [place] [address]    Care Transition Nurse contacted the patient by telephone to perform post hospital discharge assessment. Verified name and  with patient as identifiers. Provided introduction to self, and explanation of the Care Transition Nurse role. Patient: Nikki Wagner Patient : 1950   MRN: [id number]  Reason for Admission: L TKA  Discharge Date: 23 RARS: Readmission Risk Score: 10.9      Last Discharge  Street       Date Complaint Diagnosis Description Type Department Provider    23  Post-op pain . .. Admission (Discharged) Shin Ngo MD            Was this an external facility discharge? No Discharge Facility:     Challenges to be reviewed by the provider   Additional needs identified to be addressed with provider: No  none               Method of communication with provider: none. Spoke with  L TKA     Incision status: Dressing is clean, dry & intact. Edema/Swelling: Patient reports that there is not any swelling. Pain level and status: \"3\"    Use of pain medications: Yes, as directed    Bowels: No, taking     Home Care Agency active: N/A    Outpatient therapy: Patient reports that he will be going to therapy today. Do you have all of your medications: Yes    Changes in medications: None    Care Transition Nurse reviewed discharge instructions and red flags with patient who verbalized understanding. The patient was given an opportunity to ask questions and does not have any further questions or concerns at this time. Were discharge instructions available to patient? Yes. Reviewed appropriate site of care based on symptoms and resources available to patient including: PCP  Urgent care clinics  When to call 911. The patient agrees to contact the PCP office for questions related to their healthcare. Advance Care Planning:   Does patient have an Advance Directive: not on file; education provided. Medication reconciliation was performed with patient, who verbalizes understanding of administration of home medications. Medications reviewed, 1111F entered: yes    Was patient discharged with a pulse oximeter? no    Non-face-to-face services provided:  Obtained and reviewed discharge summary and/or continuity of care documents    Offered patient enrollment in the Remote Patient Monitoring (RPM) program for in-home monitoring: Patient is not eligible for RPM program.    Care Transitions 24 Hour Call    Care Transitions Interventions         Follow Up  Future Appointments   Date Time Provider Anabela Cleveland   5/1/2023  9:30 AM DO JACQUES Li [facility]   6/2/2023  9:30 AM JOSSIE BrandtO RACHEL AFL [facility]O       Care Transition Nurse provided contact information. Plan for follow-up call in 5-7 days based on severity of symptoms and risk factors.   Plan for next call: symptom management-.  self management-.  follow-up appointment-.    Lisette Streeter RN

## 2023-03-02 ENCOUNTER — CARE COORDINATION (OUTPATIENT)
Dept: CASE MANAGEMENT | Age: 73
End: 2023-03-02

## 2023-03-02 NOTE — CARE COORDINATION
Spoke with patient. Incision status: States dressing dry and intact with no drainage. Edema/Swelling: States swelling improving. States icing and elevating. Pain level and status: States pain is tolerable. Use of pain medications: States taking tylenol for pain relief. Bowels: No complaints. Outpatient therapy: Continues.     Do you have all of your medications: Yes, taking aspirin 81 mg daily    Changes in medications: No    Follow up appointments:    Future Appointments   Date Time Provider Anabela [place]   5/1/2023  9:30 AM Beverlee Primrose, DO FF Cardio [facility]   6/2/2023  9:30 AM Sprign Hutchinson PA-C AFL NASO RACHEL AFL NASO     Ival Jean-Claude BSN  Care Transition Nurse for ortho bundle  [phone]

## 2023-03-09 ENCOUNTER — CARE COORDINATION (OUTPATIENT)
Dept: CASE MANAGEMENT | Age: 73
End: 2023-03-09

## 2023-03-09 NOTE — CARE COORDINATION
Spoke with patient. Incision status: States free from redness or drainage. Edema/Swelling: States swelling improving. Continues to ice and elevate. Pain level and status: States pain is tolerable. Use of pain medications: States taking tylenol for pain relief. Bowels: No complaints. Outpatient therapy: Continues.     Do you have all of your medications: Yes    Changes in medications: No    Follow up appointments:    Future Appointments   Date Time Provider Anabela [place]   5/1/2023  9:30 AM Karen Ingram DO FF Cardio [facility]   6/2/2023  9:30 AM JOSSIE Izaguirre BSN  Care Transition Nurse for ortho bundle  [phone]

## 2023-03-16 ENCOUNTER — CARE COORDINATION (OUTPATIENT)
Dept: CASE MANAGEMENT | Age: 73
End: 2023-03-16

## 2023-03-16 NOTE — CARE COORDINATION
Spoke with patient. Incision status: States free from redness or drainage. Edema/Swelling: States swelling has improved. Pain level and status: States pain is tolerable. Use of pain medications: States taking tylenol for pain relief. Bowels: No complaints. Outpatient therapy: Continues.     Do you have all of your medications: Yes    Changes in medications: No    Follow up appointments:    Future Appointments   Date Time Provider Anabela [place]   5/1/2023  9:30 AM Florencia Low DO  Cardio [facility]   6/2/2023  9:30 AM JOSSIE Villalba BSN  Care Transition Nurse for ortho bundle  [phone]

## 2023-03-23 ENCOUNTER — CARE COORDINATION (OUTPATIENT)
Dept: CASE MANAGEMENT | Age: 73
End: 2023-03-23

## 2023-03-23 NOTE — CARE COORDINATION
Spoke with patient. Incision status: States free from redness or drainage. Edema/Swelling: States swelling has improved a lot over the past week. Pain level and status: States pain is tolerable. Use of pain medications: States has only taken tylenol for pain since the beginning and continues with tylenol as needed. Bowels: No complaints. Outpatient therapy: Continues.     Do you have all of your medications: Yes    Changes in medications: No    Follow up appointments:    Future Appointments   Date Time Provider Aanbela [place]   5/1/2023  9:30 AM Darby Scheuermann, DO FF Cardio [facility]   6/2/2023  9:30 AM JOSSIE Allen RACHEL MAHENDRA Mari BSN  Care Transition Nurse for ortho bundle  [phone]

## 2023-03-30 ENCOUNTER — CARE COORDINATION (OUTPATIENT)
Dept: CASE MANAGEMENT | Age: 73
End: 2023-03-30

## 2023-03-30 NOTE — CARE COORDINATION
Spoke with patient. Incision status: States free from redness or drainage. Edema/Swelling: States swelling has improved. Pain level and status: States pain is minimal.    Use of pain medications: States taking tylenol for pain relief. Bowels: No complaints. Outpatient therapy: Continues.     Do you have all of your medications: Yes    Changes in medications: No    Follow up appointments:    Future Appointments   Date Time Provider Anabela [place]   5/1/2023  9:30 AM DO JACQUES Monroy Cardio [facility]   6/2/2023  9:30 AM JOSSIE Woodward BSN  Care Transition Nurse for ortho bundle  [phone]

## 2023-04-05 ENCOUNTER — CARE COORDINATION (OUTPATIENT)
Dept: CASE MANAGEMENT | Age: 73
End: 2023-04-05

## 2023-04-05 NOTE — CARE COORDINATION
Spoke with patient. Incision status: States free from redness or drainage. Edema/Swelling: States swelling has improved. Pain level and status: States pain is tolerable. Bowels: No complaints. Outpatient therapy: Continues once a week through April.     Do you have all of your medications: Yes    Changes in medications: No    Follow up appointments:    Future Appointments   Date Time Provider Anabela [place]   5/1/2023  9:30 AM Bishnu Dinh DO FF Cardio [facility]   6/2/2023  9:30 AM JOSSIE Najera BSN  Care Transition Nurse for ortho bundle  [phone]

## 2023-04-21 ENCOUNTER — CARE COORDINATION (OUTPATIENT)
Dept: CASE MANAGEMENT | Age: 73
End: 2023-04-21

## 2023-04-21 NOTE — CARE COORDINATION
Spoke with patient. Incision status: States free from redness or drainage. Edema/Swelling: States swelling has improved. Pain level and status: States skin is tender, but tolerable. Bowels: No complaints. Outpatient therapy: States should complete next week.     Do you have all of your medications: Yes    Changes in medications: No    Follow up appointments:    Future Appointments   Date Time Provider Anabela [place]   5/1/2023  9:30 AM Calvin Miranda DO FF Cardio [facility]   6/2/2023  9:30 AM Ana Shawnie Opitz, PA-C AFL [facility] MER AFL NASO Fredick Gables BSN  Care Transition Nurse for ortho bundle  [phone]

## 2023-05-01 ASSESSMENT — ENCOUNTER SYMPTOMS
CHEST TIGHTNESS: 0
ABDOMINAL PAIN: 0
PHOTOPHOBIA: 0
SHORTNESS OF BREATH: 0
ABDOMINAL DISTENTION: 0
NAUSEA: 0
BLOOD IN STOOL: 0
COUGH: 0

## 2023-05-02 ENCOUNTER — OFFICE VISIT (OUTPATIENT)
Dept: CARDIOLOGY CLINIC | Age: 73
End: 2023-05-02
Payer: MEDICARE

## 2023-05-02 VITALS
HEIGHT: 66 IN | HEART RATE: 64 BPM | DIASTOLIC BLOOD PRESSURE: 66 MMHG | OXYGEN SATURATION: 97 % | WEIGHT: 198.2 LBS | SYSTOLIC BLOOD PRESSURE: 130 MMHG | BODY MASS INDEX: 31.85 KG/M2

## 2023-05-02 DIAGNOSIS — I10 ESSENTIAL HYPERTENSION, BENIGN: ICD-10-CM

## 2023-05-02 DIAGNOSIS — I25.10 ATHEROSCLEROSIS OF NATIVE CORONARY ARTERY OF NATIVE HEART WITHOUT ANGINA PECTORIS: Primary | ICD-10-CM

## 2023-05-02 DIAGNOSIS — E78.2 MIXED HYPERLIPIDEMIA: ICD-10-CM

## 2023-05-02 PROCEDURE — 99214 OFFICE O/P EST MOD 30 MIN: CPT | Performed by: INTERNAL MEDICINE

## 2023-05-02 PROCEDURE — 3074F SYST BP LT 130 MM HG: CPT | Performed by: INTERNAL MEDICINE

## 2023-05-02 PROCEDURE — G8427 DOCREV CUR MEDS BY ELIG CLIN: HCPCS | Performed by: INTERNAL MEDICINE

## 2023-05-02 PROCEDURE — 1123F ACP DISCUSS/DSCN MKR DOCD: CPT | Performed by: INTERNAL MEDICINE

## 2023-05-02 PROCEDURE — G8417 CALC BMI ABV UP PARAM F/U: HCPCS | Performed by: INTERNAL MEDICINE

## 2023-05-02 PROCEDURE — 3017F COLORECTAL CA SCREEN DOC REV: CPT | Performed by: INTERNAL MEDICINE

## 2023-05-02 PROCEDURE — 4004F PT TOBACCO SCREEN RCVD TLK: CPT | Performed by: INTERNAL MEDICINE

## 2023-05-02 PROCEDURE — 3078F DIAST BP <80 MM HG: CPT | Performed by: INTERNAL MEDICINE

## 2023-05-02 RX ORDER — METOPROLOL SUCCINATE 25 MG/1
25 TABLET, EXTENDED RELEASE ORAL DAILY
Qty: 90 TABLET | Refills: 3 | Status: SHIPPED | OUTPATIENT
Start: 2023-05-02

## 2023-05-04 ENCOUNTER — CARE COORDINATION (OUTPATIENT)
Dept: CASE MANAGEMENT | Age: 73
End: 2023-05-04

## 2023-05-04 NOTE — CARE COORDINATION
Spoke with patient. Incision status: States free from redness or drainage. Pain level and status: States not having pain. Bowels: No complaints. Outpatient therapy: Completed. Do you have all of your medications: Yes    Changes in medications: No    Instructed patient that bundle program and follow up phone calls are ending. Instructed patient to follow up with Dr. Pat Jiang for any complications/concerns.     Follow up appointments:    Future Appointments   Date Time Provider Anabela [place]   6/2/2023  9:30 AM JOSSIE Rush BSN  Care Transition Nurse for ortho bundle  [phone]

## 2023-05-26 DIAGNOSIS — N18.2 CHRONIC KIDNEY DISEASE, STAGE II (MILD): ICD-10-CM

## 2023-05-26 LAB
ALBUMIN SERPL-MCNC: 4 G/DL (ref 3.4–5)
ANION GAP SERPL CALCULATED.3IONS-SCNC: 10 MMOL/L (ref 3–16)
BUN SERPL-MCNC: 20 MG/DL (ref 7–20)
CALCIUM SERPL-MCNC: 9.7 MG/DL (ref 8.3–10.6)
CHLORIDE SERPL-SCNC: 99 MMOL/L (ref 99–110)
CO2 SERPL-SCNC: 33 MMOL/L (ref 21–32)
CREAT SERPL-MCNC: 1.2 MG/DL (ref 0.8–1.3)
CREAT UR-MCNC: 143 MG/DL (ref 39–259)
DEPRECATED RDW RBC AUTO: 15.4 % (ref 12.4–15.4)
GFR SERPLBLD CREATININE-BSD FMLA CKD-EPI: >60 ML/MIN/{1.73_M2}
GLUCOSE SERPL-MCNC: 204 MG/DL (ref 70–99)
HCT VFR BLD AUTO: 40.6 % (ref 40.5–52.5)
HGB BLD-MCNC: 13.6 G/DL (ref 13.5–17.5)
MCH RBC QN AUTO: 29 PG (ref 26–34)
MCHC RBC AUTO-ENTMCNC: 33.6 G/DL (ref 31–36)
MCV RBC AUTO: 86.2 FL (ref 80–100)
PHOSPHATE SERPL-MCNC: 2.5 MG/DL (ref 2.5–4.9)
PLATELET # BLD AUTO: 222 K/UL (ref 135–450)
PMV BLD AUTO: 8.1 FL (ref 5–10.5)
POTASSIUM SERPL-SCNC: 3.7 MMOL/L (ref 3.5–5.1)
PROT UR-MCNC: 13 MG/DL
PROT/CREAT UR-RTO: 0.1 MG/DL
RBC # BLD AUTO: 4.7 M/UL (ref 4.2–5.9)
SODIUM SERPL-SCNC: 142 MMOL/L (ref 136–145)
WBC # BLD AUTO: 5.4 K/UL (ref 4–11)

## 2023-08-02 NOTE — ANESTHESIA PROCEDURE NOTES
Effective September 1st our Abby/Cathy midwives will not do deliveries at [facility]    Our CNMs will continue clinic prenatal and postpartum care and full scope GYN care.    Patients can see a CNM for prenatal and postpartum care and;  Deliver at [facility] with one of their physician peers or,  Deliver with a CNM at another [facility], CNMs currently deliver at the following Birthplace sites:   [facility]'s [facility] ([place])    If a patient has a due date close to September 1st , CNMs will work with the patients to update their birth plan.        Thank you for coming to see the Midwives at the   [facility]    We will notify you about your labs that were drawn today once we get the results back or if you have Dasdakhart they will be posted there as well    We will call you personally with results that require further discussion    If any referrals were ordered today you should be getting a call in the next week or you may need to call the number listed with your referral to schedule.          If you need any refills of medications please call your pharmacy and they will contact us    If you have any questions or concerns before your next visit, you can reach the nurse midwife on call by calling the clinic number at [phone].    If you  wish to schedule another appointment, please call our office at [phone]. You can also make appointments through [employer]      If you have a medical emergency please call [phone].    Because you are pregnant, we have additional resources for you:    You may call our consulting RN's during normal business hours for non-urgent questions about your pregnancy.    After hours you may also page the midwife on call for urgent questions or issues by calling the triage line at [phone].  There is  Peripheral Block    Patient location during procedure: pre-op  Reason for block: post-op pain management and at surgeon's request  Start time: 2/20/2023 9:26 AM  End time: 2/20/2023 9:28 AM  Staffing  Performed: anesthesiologist   Anesthesiologist: Ally Xiong MD  Preanesthetic Checklist  Completed: patient identified, IV checked, site marked, risks and benefits discussed, surgical/procedural consents, equipment checked, pre-op evaluation, timeout performed, anesthesia consent given, oxygen available and monitors applied/VS acknowledged  Peripheral Block   Patient position: supine  Prep: ChloraPrep  Provider prep: mask and sterile gloves  Patient monitoring: cardiac monitor, continuous pulse ox, frequent blood pressure checks and IV access  Block type: Femoral  Adductor canal (Low Femoral)  Laterality: left  Injection technique: single-shot  Guidance: ultrasound guided  Local infiltration: lidocaine  Infiltration strength: 1 %  Local infiltration: lidocaine  Dose: 3 mL    Needle   Needle type: insulated echogenic nerve stimulator needle   Needle gauge: 21 G  Needle localization: ultrasound guidance  Needle length: 10 cm  Assessment   Injection assessment: negative aspiration for heme, no paresthesia on injection and local visualized surrounding nerve on ultrasound  Paresthesia pain: none  Slow fractionated injection: yes  Hemodynamics: stable  Real-time US image taken/store: yes  Outcomes: uncomplicated    Additional Notes  U/S 57919. (1) Under ultrasound guidance, a  gauge needle was inserted and placed in close proximity to the  nerve. (2) Ultrasound was also used to visualize the spread of the anesthetic in close proximity to the nerve being blocked. (3) The nerve appeared anatomically normal, and (4 there were no apparent abnormal pathological findings on the image that were readily visible and related to the nerve being blocked.  (5) A permanent ultrasound image was saved in the patient's always a midwife on call 24 hours a day.    Prenatal Reminders:    Before 14 weeks: Dating ultrasound, Genetic testing       This ultrasound helps us determine your dates accurately. Verifi can be drawn anytime after 10 weeks of gestation  16 weeks: Optional genetic testing (quad screen) or single AFP       This testing helps understand your baby's risk for some genetic abnormalities.  20 weeks:  Screening ultrasound (fetal survey)       This testing will look for early growth abnormalities, and may tell the baby's sex if you wish to find out.  28 weeks: One hour sugar test (GCT), hemoglobin and platelets       This test helps identify diabetes of pregnancy or gestational diabetes.  We also look      at the iron in your blood and how well your blood clots.  28 - 36 weeks: Tetanus shot (Tdap)       This shot helps protect you and your baby from tetanus and whooping cough.  36 weeks and later: Group B Strep test (GBS)       This test helps predict if you need antibiotics in labor to prevent infection in your baby.  Anytime September to April:  Flu shot       This shot helps protect you and your family from the flu.  This is especially important during pregnancy        Any time during or after your pregnancy you may experience increased depression and/or mood changes.    We are here to support you. Please contact us if you are:  Feeling anxious  Overwhelmed or sad   Trouble sleeping  Crying uncontrollably  Trouble caring for yourself or baby.    The typical schedule after your first visit today you can expect:     Visit 2 - 12-16 weeks  Visit 3 - 20 weeks  Visit 4 - 24 weeks  Visit 5 - 28 weeks  Visit 6 - 32 weeks  Visit 7 - 34 weeks  Visit 8 - 36 weeks  Weekly after 36 weeks until delivery.    If anything comes up between your visits or you have concerns please don't hesitate to contact us.    Secure access to your medical record:  Use [employer] (secure email communication and access to your chart) to send your primary  record.             Medications Administered  ropivacaine (NAROPIN) injection 0.5% - Perineural   10 mL - 2/20/2023 9:26:00 AM care provider a message or make an appointment. Ask someone on your Team how to sign up for [employer]. To log on to [employer] or for more information in [employer] please visit the website at [email or web address].       Certified Nurse Midwife (CNM) Team  SOFY Mayorga, CNARTURO Stevens, APRN, CNM  Kamryn Xavier, APRN, CNM  Lisa Hodge, APRN, ROBERTHM  Mindi Maria, APRN, SHARMAINE Hess, STEPHAN    Again, thank you for choosing the midwives at [facility].  We are excited to be a part of your pregnancy. Please let us know how we can best partner with you to improve your and your family's health.

## 2023-11-14 RX ORDER — LOSARTAN POTASSIUM AND HYDROCHLOROTHIAZIDE 25; 100 MG/1; MG/1
1 TABLET ORAL DAILY
Qty: 90 TABLET | Refills: 0 | Status: SHIPPED | OUTPATIENT
Start: 2023-11-14

## 2023-12-01 ENCOUNTER — HOSPITAL ENCOUNTER (OUTPATIENT)
Dept: ULTRASOUND IMAGING | Age: 73
Discharge: HOME | End: 2023-12-01
Payer: MEDICARE

## 2023-12-01 DIAGNOSIS — N28.1 RENAL CYST: ICD-10-CM

## 2023-12-01 DIAGNOSIS — N18.2 CHRONIC KIDNEY DISEASE, STAGE II (MILD): ICD-10-CM

## 2023-12-01 PROCEDURE — 76770 US EXAM ABDO BACK WALL COMP: CPT

## 2024-01-03 RX ORDER — LOSARTAN POTASSIUM AND HYDROCHLOROTHIAZIDE 25; 100 MG/1; MG/1
1 TABLET ORAL DAILY
Qty: 90 TABLET | Refills: 1 | Status: SHIPPED | OUTPATIENT
Start: 2024-01-03

## 2024-01-03 RX ORDER — ROSUVASTATIN CALCIUM 20 MG/1
20 TABLET, COATED ORAL DAILY
Qty: 90 TABLET | Refills: 1 | Status: SHIPPED | OUTPATIENT
Start: 2024-01-03

## 2024-03-04 DIAGNOSIS — E83.52 HYPERCALCEMIA: ICD-10-CM

## 2024-03-04 DIAGNOSIS — N18.2 CHRONIC KIDNEY DISEASE, STAGE II (MILD): ICD-10-CM

## 2024-03-04 LAB
DEPRECATED RDW RBC AUTO: 15 % (ref 12.4–15.4)
HCT VFR BLD AUTO: 40.3 % (ref 40.5–52.5)
HGB BLD-MCNC: 13.8 G/DL (ref 13.5–17.5)
MCH RBC QN AUTO: 29.3 PG (ref 26–34)
MCHC RBC AUTO-ENTMCNC: 34.2 G/DL (ref 31–36)
MCV RBC AUTO: 85.8 FL (ref 80–100)
PLATELET # BLD AUTO: 197 K/UL (ref 135–450)
PMV BLD AUTO: 8.3 FL (ref 5–10.5)
PTH-INTACT SERPL-MCNC: 45.7 PG/ML (ref 14–72)
RBC # BLD AUTO: 4.7 M/UL (ref 4.2–5.9)
WBC # BLD AUTO: 7.5 K/UL (ref 4–11)

## 2024-03-05 LAB
25(OH)D3 SERPL-MCNC: 28 NG/ML
ALBUMIN SERPL-MCNC: 4.1 G/DL (ref 3.4–5)
ANION GAP SERPL CALCULATED.3IONS-SCNC: 12 MMOL/L (ref 3–16)
BUN SERPL-MCNC: 19 MG/DL (ref 7–20)
CALCIUM SERPL-MCNC: 9.6 MG/DL (ref 8.3–10.6)
CHLORIDE SERPL-SCNC: 100 MMOL/L (ref 99–110)
CO2 SERPL-SCNC: 28 MMOL/L (ref 21–32)
CREAT SERPL-MCNC: 1.3 MG/DL (ref 0.8–1.3)
GFR SERPLBLD CREATININE-BSD FMLA CKD-EPI: 57 ML/MIN/{1.73_M2}
GLUCOSE SERPL-MCNC: 118 MG/DL (ref 70–99)
PHOSPHATE SERPL-MCNC: 3 MG/DL (ref 2.5–4.9)
POTASSIUM SERPL-SCNC: 3.4 MMOL/L (ref 3.5–5.1)
SODIUM SERPL-SCNC: 140 MMOL/L (ref 136–145)

## 2024-04-18 RX ORDER — METOPROLOL SUCCINATE 25 MG/1
25 TABLET, EXTENDED RELEASE ORAL DAILY
Qty: 90 TABLET | Refills: 3 | Status: SHIPPED | OUTPATIENT
Start: 2024-04-18

## 2024-04-18 NOTE — TELEPHONE ENCOUNTER
Requested Prescriptions     Pending Prescriptions Disp Refills    metoprolol succinate (TOPROL XL) 25 MG extended release tablet [Pharmacy Med Name: Metoprolol Succinate ER 25 MG Oral Tablet Extended Release 24 Hour] 90 tablet 0     Sig: Take 1 tablet by mouth once daily        [facility]     Last OV:  5/2/2023 DKW    Next OV: 5/2/2024 DKW    Last EKG: 10/26/2022     Last Filled: 05/02/2023 DKW

## 2024-05-01 ASSESSMENT — ENCOUNTER SYMPTOMS
CHEST TIGHTNESS: 0
PHOTOPHOBIA: 0
NAUSEA: 0
ABDOMINAL DISTENTION: 0
BLOOD IN STOOL: 0
COUGH: 0
ABDOMINAL PAIN: 0
SHORTNESS OF BREATH: 0

## 2024-05-02 ENCOUNTER — OFFICE VISIT (OUTPATIENT)
Dept: CARDIOLOGY CLINIC | Age: 74
End: 2024-05-02
Payer: MEDICARE

## 2024-05-02 VITALS
HEART RATE: 59 BPM | HEIGHT: 66 IN | OXYGEN SATURATION: 96 % | DIASTOLIC BLOOD PRESSURE: 70 MMHG | WEIGHT: 202 LBS | BODY MASS INDEX: 32.47 KG/M2 | SYSTOLIC BLOOD PRESSURE: 130 MMHG

## 2024-05-02 DIAGNOSIS — I65.23 CAROTID STENOSIS, ASYMPTOMATIC, BILATERAL: Primary | ICD-10-CM

## 2024-05-02 DIAGNOSIS — E78.5 HYPERLIPIDEMIA, UNSPECIFIED HYPERLIPIDEMIA TYPE: ICD-10-CM

## 2024-05-02 DIAGNOSIS — I10 UNCONTROLLED HYPERTENSION: ICD-10-CM

## 2024-05-02 DIAGNOSIS — R94.31 ABNORMAL EKG: ICD-10-CM

## 2024-05-02 DIAGNOSIS — I25.10 ASHD (ARTERIOSCLEROTIC HEART DISEASE): ICD-10-CM

## 2024-05-02 DIAGNOSIS — I35.0 AORTIC VALVE STENOSIS, ETIOLOGY OF CARDIAC VALVE DISEASE UNSPECIFIED: ICD-10-CM

## 2024-05-02 PROCEDURE — 1123F ACP DISCUSS/DSCN MKR DOCD: CPT | Performed by: INTERNAL MEDICINE

## 2024-05-02 PROCEDURE — 99214 OFFICE O/P EST MOD 30 MIN: CPT | Performed by: INTERNAL MEDICINE

## 2024-05-02 PROCEDURE — 4004F PT TOBACCO SCREEN RCVD TLK: CPT | Performed by: INTERNAL MEDICINE

## 2024-05-02 PROCEDURE — 3078F DIAST BP <80 MM HG: CPT | Performed by: INTERNAL MEDICINE

## 2024-05-02 PROCEDURE — G8427 DOCREV CUR MEDS BY ELIG CLIN: HCPCS | Performed by: INTERNAL MEDICINE

## 2024-05-02 PROCEDURE — G8417 CALC BMI ABV UP PARAM F/U: HCPCS | Performed by: INTERNAL MEDICINE

## 2024-05-02 PROCEDURE — 3017F COLORECTAL CA SCREEN DOC REV: CPT | Performed by: INTERNAL MEDICINE

## 2024-05-02 PROCEDURE — 93000 ELECTROCARDIOGRAM COMPLETE: CPT | Performed by: INTERNAL MEDICINE

## 2024-05-02 PROCEDURE — 3075F SYST BP GE 130 - 139MM HG: CPT | Performed by: INTERNAL MEDICINE

## 2024-05-02 RX ORDER — SPIRONOLACTONE 25 MG/1
25 TABLET ORAL DAILY
Qty: 90 TABLET | Refills: 3 | Status: SHIPPED | OUTPATIENT
Start: 2024-05-02

## 2024-05-02 RX ORDER — LOSARTAN POTASSIUM 100 MG/1
100 TABLET ORAL DAILY
Qty: 90 TABLET | Refills: 3 | Status: SHIPPED | OUTPATIENT
Start: 2024-05-02

## 2024-05-02 NOTE — PROGRESS NOTES
[facility]  5/2/24  Referring: Dr. Mcfarland    REASON FOR CONSULT/CHIEF COMPLAINT/HPI     Reason for visit/ Chief complaint  Follow up   CAD   HPI Bethel Alva is a 74 y.o. seen as a yearly follow up for management of  CAD, hypertension, hyperlipidemia. He has AS, carotid disease, abnormal ekg.  He had radiation for prostate cancer. He is intolerant to lipitor due to leg cramps.  He is on cialis for ED.  Has history of elevated lead levels due to exposure to bullets.    He is retired from [employer].  Has been instructed to sleep with oxygen 2 lpm at night. Not yet had a sleep study.  His father had a massive MI at age 70    Symptoms prior to stent was \"belching\" none since    Last visit he was taken off of plavix    Today he is feeling well. He has no chest pain shortness of breath palpitations or dizziness. No change in exercise tolerance. Still puffs on a pipe twice a day . Enjoys reading history and singing karaoke.  He goes up and down basement steps without stopping, last time he did this was a week ago  Has not yet had the colonoscopy, despite recommendation. He can walk up a flight of stairs         Patient is compliant with medications and is tolerating them well without side effects     HISTORY/ALLERGIES/ROS     MedHx:  has a past medical history of Arthritis, CAD (coronary artery disease), Cancer (HCC), Chronic kidney disease, Hyperlipidemia, Hypertension, and S/P radiation therapy.  SurgHx:  has a past surgical history that includes Coronary angioplasty with stent; hernia repair; and Total knee arthroplasty (Left, 2/20/2023).   SocHx:  reports that he has been smoking pipe. He has never used smokeless tobacco. He reports current alcohol use of about 2.0 standard drinks of alcohol per week. He reports that he does not use drugs.   FamHx: Father with MI  Allergies: Statins   ROS:   Review of Systems   Constitutional:  Positive for fatigue. Negative for activity change, diaphoresis and fever.   HENT:

## 2024-05-02 NOTE — PATIENT INSTRUCTIONS
Stop hyzaar  Start losartan 100 mg daily  Start spironolactone 25 mg daily  Echo   Renal panel/magnesium in 2 weeks

## 2024-05-03 DIAGNOSIS — I10 UNCONTROLLED HYPERTENSION: ICD-10-CM

## 2024-05-03 LAB
ALBUMIN SERPL-MCNC: 4 G/DL (ref 3.4–5)
ANION GAP SERPL CALCULATED.3IONS-SCNC: 11 MMOL/L (ref 3–16)
BUN SERPL-MCNC: 20 MG/DL (ref 7–20)
CALCIUM SERPL-MCNC: 9.4 MG/DL (ref 8.3–10.6)
CHLORIDE SERPL-SCNC: 100 MMOL/L (ref 99–110)
CO2 SERPL-SCNC: 31 MMOL/L (ref 21–32)
CREAT SERPL-MCNC: 1.3 MG/DL (ref 0.8–1.3)
GFR SERPLBLD CREATININE-BSD FMLA CKD-EPI: 57 ML/MIN/{1.73_M2}
GLUCOSE SERPL-MCNC: 189 MG/DL (ref 70–99)
MAGNESIUM SERPL-MCNC: 1.5 MG/DL (ref 1.8–2.4)
PHOSPHATE SERPL-MCNC: 2 MG/DL (ref 2.5–4.9)
POTASSIUM SERPL-SCNC: 3 MMOL/L (ref 3.5–5.1)
SODIUM SERPL-SCNC: 142 MMOL/L (ref 136–145)

## 2024-05-06 ENCOUNTER — TELEPHONE (OUTPATIENT)
Dept: CARDIOLOGY CLINIC | Age: 74
End: 2024-05-06

## 2024-05-06 DIAGNOSIS — I10 UNCONTROLLED HYPERTENSION: Primary | ICD-10-CM

## 2024-05-06 RX ORDER — MAGNESIUM OXIDE 400 MG/1
800 TABLET ORAL DAILY
Qty: 60 TABLET | Refills: 3 | Status: SHIPPED | OUTPATIENT
Start: 2024-05-06 | End: 2024-05-08

## 2024-05-06 NOTE — TELEPHONE ENCOUNTER
Pt wife called and stated pt is confuse about his medication changes. I told her per DKW 5/2/24 NOTE,  Instructions      Return in about 1 year (around 5/2/2025).  Stop hyzaar  Start losartan 100 mg daily  Start spironolactone 25 mg daily  Echo   Renal panel/magnesium in 2 weeks           Per today's labs results,   Irina White RN  5/6/2024 10:23 AM EDT Back to Top      Reviewed with Dr Bergman, patient should start mag ox 800 mg daily ,repeat labs on Thursday, Notified patient and verified he made the med switches Dr Lugo recommended 5/2     Pt wife stated pt had abnormal EKG at ov. They are still confused about the medications. They would like [facility] to call them back tomorrow at 9am to discuss the plan in detail.  Thank you

## 2024-05-08 RX ORDER — MAGNESIUM OXIDE 400 MG/1
400 TABLET ORAL DAILY
Qty: 30 TABLET | Refills: 3 | Status: SHIPPED | OUTPATIENT
Start: 2024-05-08

## 2024-05-08 NOTE — TELEPHONE ENCOUNTER
Discussed with wife, pharmacist told her that \"800mg of magnesium is hard on his stomach and GI system\"  also called a friend who verified this. Patient only took 400 mg yesterday and today. He is scheduled for labs tomorrow.  Will discuss with Dr Lugo if he should remain on 800 mg of magnesium and, if he should get his labs done tomorrow. Will call her back later  Provided phone number to contact so she can get on my chart

## 2024-05-08 NOTE — TELEPHONE ENCOUNTER
Please let her know that Dr Lugo recommends he take 400 mg of magnesium daily and follow up with the scheduled labs in am  thanks

## 2024-05-09 DIAGNOSIS — I10 UNCONTROLLED HYPERTENSION: ICD-10-CM

## 2024-05-09 DIAGNOSIS — E55.9 VITAMIN D DEFICIENCY: ICD-10-CM

## 2024-05-09 DIAGNOSIS — N18.2 CHRONIC KIDNEY DISEASE, STAGE II (MILD): ICD-10-CM

## 2024-05-09 LAB
25(OH)D3 SERPL-MCNC: 28.3 NG/ML
MAGNESIUM SERPL-MCNC: 1.7 MG/DL (ref 1.8–2.4)
PTH-INTACT SERPL-MCNC: 35.6 PG/ML (ref 14–72)

## 2024-05-10 DIAGNOSIS — E83.42 HYPOMAGNESEMIA: ICD-10-CM

## 2024-05-10 DIAGNOSIS — E87.6 HYPOKALEMIA: Primary | ICD-10-CM

## 2024-05-10 NOTE — RESULT ENCOUNTER NOTE
Please tell patient that magnesium has improved, but is still low. He should continue medications, including magnesium. Repeat blood work in 1 week. We want to see his labs normalize.

## 2024-05-16 DIAGNOSIS — E87.6 HYPOKALEMIA: ICD-10-CM

## 2024-05-16 DIAGNOSIS — E83.42 HYPOMAGNESEMIA: ICD-10-CM

## 2024-05-16 LAB
ALBUMIN SERPL-MCNC: 3.8 G/DL (ref 3.4–5)
ANION GAP SERPL CALCULATED.3IONS-SCNC: 10 MMOL/L (ref 3–16)
BUN SERPL-MCNC: 16 MG/DL (ref 7–20)
CALCIUM SERPL-MCNC: 9.1 MG/DL (ref 8.3–10.6)
CHLORIDE SERPL-SCNC: 106 MMOL/L (ref 99–110)
CO2 SERPL-SCNC: 26 MMOL/L (ref 21–32)
CREAT SERPL-MCNC: 1.2 MG/DL (ref 0.8–1.3)
GFR SERPLBLD CREATININE-BSD FMLA CKD-EPI: 63 ML/MIN/{1.73_M2}
GLUCOSE SERPL-MCNC: 147 MG/DL (ref 70–99)
MAGNESIUM SERPL-MCNC: 1.8 MG/DL (ref 1.8–2.4)
PHOSPHATE SERPL-MCNC: 2.1 MG/DL (ref 2.5–4.9)
POTASSIUM SERPL-SCNC: 4 MMOL/L (ref 3.5–5.1)
SODIUM SERPL-SCNC: 142 MMOL/L (ref 136–145)

## 2024-05-28 DIAGNOSIS — I65.23 CAROTID STENOSIS, ASYMPTOMATIC, BILATERAL: ICD-10-CM

## 2024-05-28 DIAGNOSIS — E78.5 HYPERLIPIDEMIA, UNSPECIFIED HYPERLIPIDEMIA TYPE: Primary | ICD-10-CM

## 2024-05-28 DIAGNOSIS — I10 UNCONTROLLED HYPERTENSION: ICD-10-CM

## 2024-06-12 DIAGNOSIS — I10 UNCONTROLLED HYPERTENSION: ICD-10-CM

## 2024-06-12 DIAGNOSIS — I65.23 CAROTID STENOSIS, ASYMPTOMATIC, BILATERAL: ICD-10-CM

## 2024-06-12 DIAGNOSIS — E78.5 HYPERLIPIDEMIA, UNSPECIFIED HYPERLIPIDEMIA TYPE: ICD-10-CM

## 2024-06-12 LAB
ALBUMIN SERPL-MCNC: 4 G/DL (ref 3.4–5)
ANION GAP SERPL CALCULATED.3IONS-SCNC: 11 MMOL/L (ref 3–16)
BUN SERPL-MCNC: 22 MG/DL (ref 7–20)
CALCIUM SERPL-MCNC: 9.3 MG/DL (ref 8.3–10.6)
CHLORIDE SERPL-SCNC: 105 MMOL/L (ref 99–110)
CO2 SERPL-SCNC: 27 MMOL/L (ref 21–32)
CREAT SERPL-MCNC: 1.2 MG/DL (ref 0.8–1.3)
GFR SERPLBLD CREATININE-BSD FMLA CKD-EPI: 63 ML/MIN/{1.73_M2}
GLUCOSE SERPL-MCNC: 176 MG/DL (ref 70–99)
MAGNESIUM SERPL-MCNC: 1.9 MG/DL (ref 1.8–2.4)
PHOSPHATE SERPL-MCNC: 2.3 MG/DL (ref 2.5–4.9)
POTASSIUM SERPL-SCNC: 4 MMOL/L (ref 3.5–5.1)
SODIUM SERPL-SCNC: 143 MMOL/L (ref 136–145)

## 2024-06-19 ENCOUNTER — TELEPHONE (OUTPATIENT)
Dept: CARDIOLOGY CLINIC | Age: 74
End: 2024-06-19

## 2024-06-19 NOTE — PROGRESS NOTES
Pt's wife called to get lab results, wanted to know if he is to continue to take magnesium? And pt is complaining of pain in his right side where his kidney is, could this be caused by any of his medications? Pt has an appt with PCP tomorrow, 6/20/24 at 11:30am.   Can call her back at [phone].

## 2024-07-09 RX ORDER — ROSUVASTATIN CALCIUM 20 MG/1
20 TABLET, COATED ORAL DAILY
Qty: 90 TABLET | Refills: 3 | Status: SHIPPED | OUTPATIENT
Start: 2024-07-09

## 2024-07-09 NOTE — TELEPHONE ENCOUNTER
Received refill request for rosuvastatin (CRESTOR) 20 MG tablet  from [facility] pharmacy.     Last OV: 5/2/2024 DKW    Next OV: NA    Last Labs:  Lipid, CMP 12/4/2023 Care Everywhere.    Last Filled: 1/3/2024 DKW

## 2024-07-18 ENCOUNTER — HOSPITAL ENCOUNTER (OUTPATIENT)
Age: 74
Discharge: HOME | End: 2024-07-20
Attending: INTERNAL MEDICINE
Payer: MEDICARE

## 2024-07-18 VITALS
DIASTOLIC BLOOD PRESSURE: 70 MMHG | BODY MASS INDEX: 33.66 KG/M2 | HEIGHT: 65 IN | WEIGHT: 202 LBS | SYSTOLIC BLOOD PRESSURE: 130 MMHG

## 2024-07-18 DIAGNOSIS — R94.31 ABNORMAL EKG: ICD-10-CM

## 2024-07-18 DIAGNOSIS — I35.0 AORTIC VALVE STENOSIS, ETIOLOGY OF CARDIAC VALVE DISEASE UNSPECIFIED: ICD-10-CM

## 2024-07-18 LAB
ECHO AO ASC DIAM: 3.4 CM
ECHO AO ASCENDING AORTA INDEX: 1.71 CM/M2
ECHO AO ROOT DIAM: 3.2 CM
ECHO AO ROOT INDEX: 1.61 CM/M2
ECHO AV AREA PEAK VELOCITY: 1.9 CM2
ECHO AV AREA VTI: 2 CM2
ECHO AV AREA/BSA PEAK VELOCITY: 1 CM2/M2
ECHO AV AREA/BSA VTI: 1 CM2/M2
ECHO AV MEAN GRADIENT: 10 MMHG
ECHO AV MEAN VELOCITY: 1.4 M/S
ECHO AV PEAK GRADIENT: 20 MMHG
ECHO AV PEAK VELOCITY: 2.3 M/S
ECHO AV VELOCITY RATIO: 0.61
ECHO AV VTI: 47.1 CM
ECHO BSA: 2.05 M2
ECHO LA AREA 2C: 16.2 CM2
ECHO LA AREA 4C: 15.2 CM2
ECHO LA DIAMETER INDEX: 1.91 CM/M2
ECHO LA DIAMETER: 3.8 CM
ECHO LA MAJOR AXIS: 4.7 CM
ECHO LA MINOR AXIS: 5.3 CM
ECHO LA TO AORTIC ROOT RATIO: 1.19
ECHO LA VOL BP: 43 ML (ref 18–58)
ECHO LA VOL MOD A2C: 41 ML (ref 18–58)
ECHO LA VOL MOD A4C: 40 ML (ref 18–58)
ECHO LA VOL/BSA BIPLANE: 22 ML/M2 (ref 16–34)
ECHO LA VOLUME INDEX MOD A2C: 21 ML/M2 (ref 16–34)
ECHO LA VOLUME INDEX MOD A4C: 20 ML/M2 (ref 16–34)
ECHO LV E' LATERAL VELOCITY: 7 CM/S
ECHO LV E' SEPTAL VELOCITY: 6 CM/S
ECHO LV EDV A2C: 79 ML
ECHO LV EDV A4C: 77 ML
ECHO LV EDV INDEX A4C: 39 ML/M2
ECHO LV EDV NDEX A2C: 40 ML/M2
ECHO LV EJECTION FRACTION A2C: 55 %
ECHO LV EJECTION FRACTION A4C: 55 %
ECHO LV EJECTION FRACTION BIPLANE: 55 % (ref 55–100)
ECHO LV ESV A2C: 35 ML
ECHO LV ESV A4C: 35 ML
ECHO LV ESV INDEX A2C: 18 ML/M2
ECHO LV ESV INDEX A4C: 18 ML/M2
ECHO LV FRACTIONAL SHORTENING: 32 % (ref 28–44)
ECHO LV INTERNAL DIMENSION DIASTOLE INDEX: 1.86 CM/M2
ECHO LV INTERNAL DIMENSION DIASTOLIC: 3.7 CM (ref 4.2–5.9)
ECHO LV INTERNAL DIMENSION SYSTOLIC INDEX: 1.26 CM/M2
ECHO LV INTERNAL DIMENSION SYSTOLIC: 2.5 CM
ECHO LV IVSD: 1.3 CM (ref 0.6–1)
ECHO LV MASS 2D: 147.3 G (ref 88–224)
ECHO LV MASS INDEX 2D: 74 G/M2 (ref 49–115)
ECHO LV POSTERIOR WALL DIASTOLIC: 1.1 CM (ref 0.6–1)
ECHO LV RELATIVE WALL THICKNESS RATIO: 0.59
ECHO LVOT AREA: 3.1 CM2
ECHO LVOT AV VTI INDEX: 0.64
ECHO LVOT DIAM: 2 CM
ECHO LVOT MEAN GRADIENT: 4 MMHG
ECHO LVOT PEAK GRADIENT: 7 MMHG
ECHO LVOT PEAK VELOCITY: 1.4 M/S
ECHO LVOT STROKE VOLUME INDEX: 47.7 ML/M2
ECHO LVOT SV: 94.8 ML
ECHO LVOT VTI: 30.2 CM
ECHO MV A VELOCITY: 1.1 M/S
ECHO MV AREA VTI: 2 CM2
ECHO MV E DECELERATION TIME (DT): 364 MS
ECHO MV E VELOCITY: 0.9 M/S
ECHO MV E/A RATIO: 0.82
ECHO MV E/E' LATERAL: 12.86
ECHO MV E/E' RATIO (AVERAGED): 13.93
ECHO MV E/E' SEPTAL: 15
ECHO MV LVOT VTI INDEX: 1.58
ECHO MV MAX VELOCITY: 1.1 M/S
ECHO MV MEAN GRADIENT: 2 MMHG
ECHO MV MEAN VELOCITY: 0.6 M/S
ECHO MV PEAK GRADIENT: 5 MMHG
ECHO MV VTI: 47.6 CM
ECHO PV MAX VELOCITY: 1.3 M/S
ECHO PV PEAK GRADIENT: 6 MMHG
ECHO RA AREA 4C: 18.1 CM2
ECHO RA END SYSTOLIC VOLUME APICAL 4 CHAMBER INDEX BSA: 25 ML/M2
ECHO RA VOLUME: 50 ML
ECHO RV BASAL DIMENSION: 3.8 CM
ECHO RV FREE WALL PEAK S': 16 CM/S
ECHO RV LONGITUDINAL DIMENSION: 8.7 CM
ECHO RV MID DIMENSION: 1.9 CM
ECHO RV TAPSE: 2 CM (ref 1.7–?)

## 2024-07-18 PROCEDURE — 93306 TTE W/DOPPLER COMPLETE: CPT | Performed by: INTERNAL MEDICINE

## 2024-07-18 PROCEDURE — 93306 TTE W/DOPPLER COMPLETE: CPT

## 2024-07-24 ENCOUNTER — TELEPHONE (OUTPATIENT)
Dept: CARDIOLOGY CLINIC | Age: 74
End: 2024-07-24

## 2024-07-26 NOTE — TELEPHONE ENCOUNTER
Please let him know dkw reviewed echo and it is stable. He has mild leakage of the aortic valve, we may repeat echo later on   thanks

## 2024-07-29 ENCOUNTER — TELEPHONE (OUTPATIENT)
Dept: CARDIOLOGY CLINIC | Age: 74
End: 2024-07-29

## 2024-07-29 NOTE — TELEPHONE ENCOUNTER
Pt wife Aleida called and would like to know if pt can take anabiotic be fore teeth cleaning with the results of the ECHO?    Do to the patient not hearing well:  Aleida would like the office to call her back to get more answers about the leaky valve.     Is there katie other test to see how severe the leaky valve is?  Pt has been getting endemia in legs  this past year could that be from the leaky valve?  What do they need to watch for from here on out?    Aleida   [phone]

## 2024-07-29 NOTE — TELEPHONE ENCOUNTER
Per DKW - Echo has been similar for the past 3 years, no other testing is needed.  The edema is likely related to his kidney disease.  She is more than welcome to come to his appointment with him.      I called and spoke to Aleida and advised of the above.  She also states that the ortho doctor advised for him to take Amoxicillin 2,000 mg prior to any dental work.  She is confused as to why he needs to do this and wants to make sure that it is okay that he does so.  I advised for her to reach out to [place] for clarification and that I would talk to [facility] about this in the morning and give her a call back tomorrow.  She verbalized understanding and denied any other questions and/or concerns.

## 2024-07-30 NOTE — TELEPHONE ENCOUNTER
Per DKW - From a cardiac standpoint he does not need to take antibiotics prior to dental cleanings/procedures.      I called and relayed the above information to Aleida.  She verbalized understanding.  States that she spoke to Payton with [facility] and was told that he no longer needs to take the antibiotics since he is 1 year out from his surgery with them that he no longer needs to take the antibiotics prior.  Call complete.

## 2024-11-11 RX ORDER — MAGNESIUM OXIDE 400 MG/1
400 TABLET ORAL DAILY
Qty: 30 TABLET | Refills: 5 | Status: SHIPPED | OUTPATIENT
Start: 2024-11-11

## 2024-11-11 RX ORDER — MAGNESIUM OXIDE TAB 400 MG (241.3 MG ELEMENTAL MG) 400 (241.3 MG) MG
800 TAB ORAL DAILY
Qty: 60 TABLET | Refills: 0 | OUTPATIENT
Start: 2024-11-11

## 2024-11-11 NOTE — TELEPHONE ENCOUNTER
Received refill request for Magnesium from [facility] pharmacy.    Last ov:05/02/2024 DKW    Last labs:06/12/2024 Mag    Last Refill:05/08/2024    Next appointment:On recall list for 05/02/225 JULIETA

## 2025-04-14 RX ORDER — SPIRONOLACTONE 25 MG/1
25 TABLET ORAL DAILY
Qty: 90 TABLET | Refills: 0 | Status: SHIPPED | OUTPATIENT
Start: 2025-04-14

## 2025-04-14 RX ORDER — LOSARTAN POTASSIUM 100 MG/1
100 TABLET ORAL DAILY
Qty: 90 TABLET | Refills: 0 | Status: SHIPPED | OUTPATIENT
Start: 2025-04-14

## 2025-04-14 RX ORDER — METOPROLOL SUCCINATE 25 MG/1
25 TABLET, EXTENDED RELEASE ORAL DAILY
Qty: 90 TABLET | Refills: 0 | Status: SHIPPED | OUTPATIENT
Start: 2025-04-14

## 2025-04-14 NOTE — TELEPHONE ENCOUNTER
Requested Prescriptions     Pending Prescriptions Disp Refills    spironolactone (ALDACTONE) 25 MG tablet [Pharmacy Med Name: Spironolactone 25 MG Oral Tablet] 90 tablet 0     Sig: Take 1 tablet by mouth once daily    losartan (COZAAR) 100 MG tablet [Pharmacy Med Name: Losartan Potassium 100 MG Oral Tablet] 90 tablet 0     Sig: Take 1 tablet by mouth once daily    metoprolol succinate (TOPROL XL) 25 MG extended release tablet [Pharmacy Med Name: Metoprolol Succinate ER 25 MG Oral Tablet Extended Release 24 Hour] 90 tablet 0     Sig: Take 1 tablet by mouth once daily      Last OV:  5/2/2024 DKW    Next OV: 5/7/2025 DKW     Last Labs: 05/31/2024 CMP, 05/02/2024 EKG    Last Filled: 04/18/2024, 05/02/2024 DKW

## 2025-05-15 ENCOUNTER — OFFICE VISIT (OUTPATIENT)
Dept: CARDIOLOGY CLINIC | Age: 75
End: 2025-05-15
Payer: MEDICARE

## 2025-05-15 VITALS
DIASTOLIC BLOOD PRESSURE: 72 MMHG | HEIGHT: 66 IN | OXYGEN SATURATION: 98 % | HEART RATE: 60 BPM | SYSTOLIC BLOOD PRESSURE: 138 MMHG | BODY MASS INDEX: 34.49 KG/M2 | WEIGHT: 214.6 LBS

## 2025-05-15 DIAGNOSIS — I25.10 ASHD (ARTERIOSCLEROTIC HEART DISEASE): Primary | ICD-10-CM

## 2025-05-15 DIAGNOSIS — I35.0 AORTIC VALVE STENOSIS, ETIOLOGY OF CARDIAC VALVE DISEASE UNSPECIFIED: ICD-10-CM

## 2025-05-15 DIAGNOSIS — E78.2 MIXED HYPERLIPIDEMIA: ICD-10-CM

## 2025-05-15 DIAGNOSIS — I10 ESSENTIAL HYPERTENSION: ICD-10-CM

## 2025-05-15 DIAGNOSIS — M25.569 KNEE PAIN, UNSPECIFIED CHRONICITY, UNSPECIFIED LATERALITY: ICD-10-CM

## 2025-05-15 PROCEDURE — 1036F TOBACCO NON-USER: CPT | Performed by: INTERNAL MEDICINE

## 2025-05-15 PROCEDURE — G8427 DOCREV CUR MEDS BY ELIG CLIN: HCPCS | Performed by: INTERNAL MEDICINE

## 2025-05-15 PROCEDURE — 3078F DIAST BP <80 MM HG: CPT | Performed by: INTERNAL MEDICINE

## 2025-05-15 PROCEDURE — 99214 OFFICE O/P EST MOD 30 MIN: CPT | Performed by: INTERNAL MEDICINE

## 2025-05-15 PROCEDURE — 1159F MED LIST DOCD IN RCRD: CPT | Performed by: INTERNAL MEDICINE

## 2025-05-15 PROCEDURE — 1123F ACP DISCUSS/DSCN MKR DOCD: CPT | Performed by: INTERNAL MEDICINE

## 2025-05-15 PROCEDURE — 3075F SYST BP GE 130 - 139MM HG: CPT | Performed by: INTERNAL MEDICINE

## 2025-05-15 PROCEDURE — G8417 CALC BMI ABV UP PARAM F/U: HCPCS | Performed by: INTERNAL MEDICINE

## 2025-05-15 RX ORDER — MAGNESIUM OXIDE 400 MG/1
400 TABLET ORAL DAILY
Qty: 90 TABLET | Refills: 3 | Status: SHIPPED | OUTPATIENT
Start: 2025-05-15

## 2025-05-15 RX ORDER — METOPROLOL SUCCINATE 25 MG/1
25 TABLET, EXTENDED RELEASE ORAL DAILY
Qty: 90 TABLET | Refills: 3 | Status: SHIPPED | OUTPATIENT
Start: 2025-05-15

## 2025-05-15 RX ORDER — LOSARTAN POTASSIUM 100 MG/1
100 TABLET ORAL DAILY
Qty: 90 TABLET | Refills: 3 | Status: SHIPPED | OUTPATIENT
Start: 2025-05-15

## 2025-05-15 RX ORDER — SPIRONOLACTONE 25 MG/1
25 TABLET ORAL DAILY
Qty: 90 TABLET | Refills: 3 | Status: SHIPPED | OUTPATIENT
Start: 2025-05-15

## 2025-05-15 RX ORDER — ROSUVASTATIN CALCIUM 20 MG/1
20 TABLET, COATED ORAL DAILY
Qty: 90 TABLET | Refills: 3 | Status: SHIPPED | OUTPATIENT
Start: 2025-05-15

## 2025-05-15 ASSESSMENT — ENCOUNTER SYMPTOMS
NAUSEA: 0
SHORTNESS OF BREATH: 0
PHOTOPHOBIA: 0
CHEST TIGHTNESS: 0
BLOOD IN STOOL: 0
ABDOMINAL PAIN: 0
ABDOMINAL DISTENTION: 0
COUGH: 0

## 2025-05-15 NOTE — PROGRESS NOTES
[facility]  5/15/25  Referring: Dr. Mcfarland    REASON FOR CONSULT/CHIEF COMPLAINT/HPI     Reason for visit/ Chief complaint  Follow up   CAD   HPI Bethel Alva is a 75 y.o. seen as a yearly follow up for management of  CAD, hypertension, hyperlipidemia. He has AS, carotid disease, abnormal ekg.  He had radiation for prostate cancer. He is intolerant to lipitor due to leg cramps.  He is on cialis for ED.  Has history of elevated lead levels due to exposure to bullets.    He is retired from .  Has been instructed to sleep with oxygen 2 lpm at night. Not yet had a sleep study.  His father had a massive MI at age 70    Symptoms prior to stent was \"belching\" none since        Today he is feeling well. He has no chest pain shortness of breath palpitations or dizziness. No change in exercise tolerance. He quit smoking.   He is considering having his  right knee replaced.  Only gets one week relief from cortisone injecitons.   He is able to walk up  a flight of stairs, only limited by knee pain         Patient is compliant with medications and is tolerating them well without side effects     HISTORY/ALLERGIES/ROS     MedHx:  has a past medical history of Arthritis, CAD (coronary artery disease), Cancer (HCC), Chronic kidney disease, Hyperlipidemia, Hypertension, and S/P radiation therapy.  SurgHx:  has a past surgical history that includes Coronary angioplasty with stent; hernia repair; and Total knee arthroplasty (Left, 2/20/2023).   SocHx:  reports that he has been smoking pipe. He has never used smokeless tobacco. He reports current alcohol use of about 2.0 standard drinks of alcohol per week. He reports that he does not use drugs.   FamHx: Father with MI  Allergies: Statins   ROS:   Review of Systems   Constitutional:  Positive for fatigue. Negative for activity change, diaphoresis and fever.   HENT:  Negative for congestion and ear discharge.    Eyes:  Negative for photophobia and visual disturbance.

## (undated) DEVICE — GLOVE ORTHO 8   MSG9480

## (undated) DEVICE — SUTURE MCRYL + SZ 4-0 L27IN ABSRB UD L19MM PS-2 3/8 CIR MCP426H

## (undated) DEVICE — KNEE HOLDER DISPOSABLE LINER: Brand: ALVARADO®  KNEE SUPPORT

## (undated) DEVICE — GLOVE ORANGE PI 8   MSG9080

## (undated) DEVICE — PENCIL SMK EVAC TELSCP 3 M TBNG

## (undated) DEVICE — BANDAGE COMPR W6INXL10YD ST M E WHITE/BEIGE

## (undated) DEVICE — 3 BONE CEMENT MIXER: Brand: MIXEVAC

## (undated) DEVICE — HOOD, PEEL-AWAY: Brand: FLYTE

## (undated) DEVICE — FAIRFIELD KNEE LF

## (undated) DEVICE — T4 HOOD

## (undated) DEVICE — SYRINGE MED 30ML STD CLR PLAS LUERLOCK TIP N CTRL DISP

## (undated) DEVICE — SOLUTION IRRIG 3000ML 0.9% SOD CHL USP UROMATIC PLAS CONT

## (undated) DEVICE — MASC TURNOVER KIT: Brand: MEDLINE INDUSTRIES, INC.

## (undated) DEVICE — DECANTER FLD 9IN ST BG FOR ASEP TRNSF OF FLD

## (undated) DEVICE — DRESSING WND 4X12 IN ANTIMICROBIAL PROTCT THERABOND 3D

## (undated) DEVICE — SUTURE VCRL SZ 0 L36IN ABSRB UD CT-1 L36MM 1/2 CIR TAPR PNT VCP946H

## (undated) DEVICE — STERILE TOTAL KNEE DRAPE PACK: Brand: CARDINAL HEALTH

## (undated) DEVICE — SUTURE VCRL + SZ 2-0 L36IN ABSRB UD L36MM CT-1 1/2 CIR VCP945H

## (undated) DEVICE — SUTURE STRATAFIX SZ 1 L14IN ABSRB VLT L36MM MO-4 TAPERPOINT SXPD2B400

## (undated) DEVICE — DRESSING CNTCT 4X12IN IS THERABOND 3D

## (undated) DEVICE — ATTUNE SOLO PINNING SYSTEM

## (undated) DEVICE — APPLICATOR MEDICATED 26 CC SOLUTION HI LT ORNG CHLORAPREP

## (undated) DEVICE — 450 ML BOTTLE OF 0.05% CHLORHEXIDINE GLUCONATE IN 99.95% STERILE WATER FOR IRRIGATION, USP AND APPLICATOR.: Brand: IRRISEPT ANTIMICROBIAL WOUND LAVAGE

## (undated) DEVICE — SYSTEM SKIN CLSR 22CM DERMBND PRINEO

## (undated) DEVICE — TOTAL BASIC PK

## (undated) DEVICE — CONTAINER,SPECIMEN,OR STERILE,4OZ: Brand: MEDLINE

## (undated) DEVICE — RECIPROCATING BLADE, DOUBLE SIDED, OFFSET  (70.0 X 0.64 X 12.6MM)

## (undated) DEVICE — HANDPIECE SET WITH HIGH FLOW TIP AND SUCTION TUBE: Brand: INTERPULSE